# Patient Record
Sex: FEMALE | Race: BLACK OR AFRICAN AMERICAN | NOT HISPANIC OR LATINO | Employment: UNEMPLOYED | RURAL
[De-identification: names, ages, dates, MRNs, and addresses within clinical notes are randomized per-mention and may not be internally consistent; named-entity substitution may affect disease eponyms.]

---

## 2017-06-14 ENCOUNTER — HISTORICAL (OUTPATIENT)
Dept: ADMINISTRATIVE | Facility: HOSPITAL | Age: 27
End: 2017-06-14

## 2017-06-16 LAB
LAB AP CLINICAL INFORMATION: NORMAL
LAB AP DIAGNOSIS - HISTORICAL: NORMAL
LAB AP GROSS PATHOLOGY - HISTORICAL: NORMAL
LAB AP SPECIMEN SUBMITTED - HISTORICAL: NORMAL

## 2021-07-12 ENCOUNTER — HOSPITAL ENCOUNTER (EMERGENCY)
Facility: HOSPITAL | Age: 31
Discharge: HOME | End: 2021-07-12

## 2021-07-12 VITALS
HEIGHT: 61 IN | HEART RATE: 77 BPM | WEIGHT: 120 LBS | TEMPERATURE: 97 F | BODY MASS INDEX: 22.66 KG/M2 | OXYGEN SATURATION: 96 % | SYSTOLIC BLOOD PRESSURE: 121 MMHG | RESPIRATION RATE: 18 BRPM | DIASTOLIC BLOOD PRESSURE: 82 MMHG

## 2021-07-12 DIAGNOSIS — S29.012A MUSCLE STRAIN OF LEFT UPPER BACK, INITIAL ENCOUNTER: Primary | ICD-10-CM

## 2021-07-12 PROCEDURE — 96372 THER/PROPH/DIAG INJ SC/IM: CPT

## 2021-07-12 PROCEDURE — 99283 EMERGENCY DEPT VISIT LOW MDM: CPT | Mod: ,,, | Performed by: NURSE PRACTITIONER

## 2021-07-12 PROCEDURE — 63600175 PHARM REV CODE 636 W HCPCS: Performed by: NURSE PRACTITIONER

## 2021-07-12 PROCEDURE — 99284 EMERGENCY DEPT VISIT MOD MDM: CPT

## 2021-07-12 PROCEDURE — 99283 PR EMERGENCY DEPT VISIT,LEVEL III: ICD-10-PCS | Mod: ,,, | Performed by: NURSE PRACTITIONER

## 2021-07-12 RX ORDER — ORPHENADRINE CITRATE 30 MG/ML
30 INJECTION INTRAMUSCULAR; INTRAVENOUS
Status: COMPLETED | OUTPATIENT
Start: 2021-07-12 | End: 2021-07-12

## 2021-07-12 RX ORDER — TIZANIDINE HYDROCHLORIDE 2 MG/1
2 CAPSULE, GELATIN COATED ORAL EVERY 8 HOURS PRN
Qty: 20 CAPSULE | Refills: 0 | Status: SHIPPED | OUTPATIENT
Start: 2021-07-12 | End: 2021-07-22

## 2021-07-12 RX ORDER — KETOROLAC TROMETHAMINE 30 MG/ML
30 INJECTION, SOLUTION INTRAMUSCULAR; INTRAVENOUS
Status: COMPLETED | OUTPATIENT
Start: 2021-07-12 | End: 2021-07-12

## 2021-07-12 RX ORDER — IBUPROFEN 600 MG/1
600 TABLET ORAL EVERY 6 HOURS PRN
Qty: 20 TABLET | Refills: 0 | Status: SHIPPED | OUTPATIENT
Start: 2021-07-12 | End: 2023-11-03 | Stop reason: CLARIF

## 2021-07-12 RX ORDER — ORPHENADRINE CITRATE 30 MG/ML
INJECTION INTRAMUSCULAR; INTRAVENOUS
Status: DISPENSED
Start: 2021-07-12 | End: 2021-07-13

## 2021-07-12 RX ORDER — KETOROLAC TROMETHAMINE 30 MG/ML
INJECTION, SOLUTION INTRAMUSCULAR; INTRAVENOUS
Status: DISPENSED
Start: 2021-07-12 | End: 2021-07-13

## 2021-07-12 RX ADMIN — ORPHENADRINE CITRATE 30 MG: 30 INJECTION INTRAMUSCULAR; INTRAVENOUS at 02:07

## 2021-07-12 RX ADMIN — KETOROLAC TROMETHAMINE 30 MG: 30 INJECTION, SOLUTION INTRAMUSCULAR at 02:07

## 2021-07-13 ENCOUNTER — TELEPHONE (OUTPATIENT)
Dept: EMERGENCY MEDICINE | Facility: HOSPITAL | Age: 31
End: 2021-07-13

## 2022-07-06 ENCOUNTER — OFFICE VISIT (OUTPATIENT)
Dept: OBSTETRICS AND GYNECOLOGY | Facility: CLINIC | Age: 32
End: 2022-07-06
Payer: MEDICAID

## 2022-07-06 VITALS
BODY MASS INDEX: 31.72 KG/M2 | DIASTOLIC BLOOD PRESSURE: 90 MMHG | TEMPERATURE: 98 F | HEART RATE: 90 BPM | WEIGHT: 168 LBS | SYSTOLIC BLOOD PRESSURE: 112 MMHG | RESPIRATION RATE: 16 BRPM | HEIGHT: 61 IN

## 2022-07-06 DIAGNOSIS — M10.9 GOUT, ARTHRITIS: ICD-10-CM

## 2022-07-06 DIAGNOSIS — R23.2 HOT FLASHES: ICD-10-CM

## 2022-07-06 DIAGNOSIS — E55.9 VITAMIN D DEFICIENCY: ICD-10-CM

## 2022-07-06 DIAGNOSIS — Z11.3 SCREENING FOR STDS (SEXUALLY TRANSMITTED DISEASES): ICD-10-CM

## 2022-07-06 DIAGNOSIS — N62 BREAST HYPERTROPHY IN FEMALE: ICD-10-CM

## 2022-07-06 DIAGNOSIS — N91.1 SECONDARY AMENORRHEA: ICD-10-CM

## 2022-07-06 DIAGNOSIS — N91.5 OLIGOMENORRHEA, UNSPECIFIED TYPE: ICD-10-CM

## 2022-07-06 DIAGNOSIS — Z01.419 ENCOUNTER FOR ANNUAL ROUTINE GYNECOLOGICAL EXAMINATION: Primary | ICD-10-CM

## 2022-07-06 LAB
25(OH)D3 SERPL-MCNC: 29.6 NG/ML
ALBUMIN SERPL BCP-MCNC: 4.1 G/DL (ref 3.5–5)
ALBUMIN/GLOB SERPL: 1.1 {RATIO}
ALP SERPL-CCNC: 74 U/L (ref 37–98)
ALT SERPL W P-5'-P-CCNC: 22 U/L (ref 13–56)
ANION GAP SERPL CALCULATED.3IONS-SCNC: 9 MMOL/L (ref 7–16)
AST SERPL W P-5'-P-CCNC: 15 U/L (ref 15–37)
BACTERIA #/AREA URNS HPF: ABNORMAL /HPF
BASOPHILS # BLD AUTO: 0.06 K/UL (ref 0–0.2)
BASOPHILS NFR BLD AUTO: 1 % (ref 0–1)
BILIRUB SERPL-MCNC: 0.4 MG/DL (ref 0–1.2)
BILIRUB UR QL STRIP: NEGATIVE
BUN SERPL-MCNC: 13 MG/DL (ref 7–18)
BUN/CREAT SERPL: 11 (ref 6–20)
CALCIUM SERPL-MCNC: 9.5 MG/DL (ref 8.5–10.1)
CANDIDA SPECIES: NEGATIVE
CHLORIDE SERPL-SCNC: 106 MMOL/L (ref 98–107)
CHOLEST SERPL-MCNC: 165 MG/DL (ref 0–200)
CHOLEST/HDLC SERPL: 3.7 {RATIO}
CLARITY UR: CLEAR
CO2 SERPL-SCNC: 26 MMOL/L (ref 21–32)
COLOR UR: ABNORMAL
CREAT SERPL-MCNC: 1.15 MG/DL (ref 0.55–1.02)
DIFFERENTIAL METHOD BLD: ABNORMAL
EOSINOPHIL # BLD AUTO: 0.39 K/UL (ref 0–0.5)
EOSINOPHIL NFR BLD AUTO: 6.5 % (ref 1–4)
ERYTHROCYTE [DISTWIDTH] IN BLOOD BY AUTOMATED COUNT: 13.6 % (ref 11.5–14.5)
EST. AVERAGE GLUCOSE BLD GHB EST-MCNC: 100 MG/DL
ESTRADIOL SERPL-MCNC: <11 PG/ML
FSH SERPL-ACNC: 110.9 MIU/ML (ref 1.7–134.8)
GARDNERELLA: POSITIVE
GLOBULIN SER-MCNC: 3.7 G/DL (ref 2–4)
GLUCOSE SERPL-MCNC: 83 MG/DL (ref 74–106)
GLUCOSE UR STRIP-MCNC: NEGATIVE MG/DL
HBA1C MFR BLD HPLC: 5.6 % (ref 4.5–6.6)
HCG SERUM QUALITATIVE: NEGATIVE
HCT VFR BLD AUTO: 39.1 % (ref 38–47)
HDLC SERPL-MCNC: 45 MG/DL (ref 40–60)
HGB BLD-MCNC: 13.3 G/DL (ref 12–16)
IMM GRANULOCYTES # BLD AUTO: 0.02 K/UL (ref 0–0.04)
IMM GRANULOCYTES NFR BLD: 0.3 % (ref 0–0.4)
KETONES UR STRIP-SCNC: NEGATIVE MG/DL
LDLC SERPL CALC-MCNC: 109 MG/DL
LDLC/HDLC SERPL: 2.4 {RATIO}
LEUKOCYTE ESTERASE UR QL STRIP: ABNORMAL
LH SERPL-ACNC: 57.3 MIU/ML
LYMPHOCYTES # BLD AUTO: 2.3 K/UL (ref 1–4.8)
LYMPHOCYTES NFR BLD AUTO: 38.4 % (ref 27–41)
MCH RBC QN AUTO: 28.2 PG (ref 27–31)
MCHC RBC AUTO-ENTMCNC: 34 G/DL (ref 32–36)
MCV RBC AUTO: 83 FL (ref 80–96)
MONOCYTES # BLD AUTO: 0.51 K/UL (ref 0–0.8)
MONOCYTES NFR BLD AUTO: 8.5 % (ref 2–6)
MPC BLD CALC-MCNC: 10.3 FL (ref 9.4–12.4)
NEUTROPHILS # BLD AUTO: 2.71 K/UL (ref 1.8–7.7)
NEUTROPHILS NFR BLD AUTO: 45.3 % (ref 53–65)
NITRITE UR QL STRIP: NEGATIVE
NONHDLC SERPL-MCNC: 120 MG/DL
NRBC # BLD AUTO: 0 X10E3/UL
NRBC, AUTO (.00): 0 %
PH UR STRIP: 6 PH UNITS
PLATELET # BLD AUTO: 294 K/UL (ref 150–400)
POTASSIUM SERPL-SCNC: 4.2 MMOL/L (ref 3.5–5.1)
PROGEST SERPL-MCNC: 1.3 NG/ML
PROLACTIN SERPL-MCNC: 8.3 NG/ML
PROT SERPL-MCNC: 7.8 G/DL (ref 6.4–8.2)
PROT UR QL STRIP: NEGATIVE
RBC # BLD AUTO: 4.71 M/UL (ref 4.2–5.4)
RBC # UR STRIP: NEGATIVE /UL
RBC #/AREA URNS HPF: ABNORMAL /HPF
SODIUM SERPL-SCNC: 137 MMOL/L (ref 136–145)
SP GR UR STRIP: 1.02
SQUAMOUS #/AREA URNS LPF: ABNORMAL /LPF
SYPHILIS AB INTERPRETATION: NORMAL
T4 FREE SERPL-MCNC: 1.02 NG/DL (ref 0.76–1.46)
TRICHOMONAS: NEGATIVE
TRIGL SERPL-MCNC: 54 MG/DL (ref 35–150)
TSH SERPL DL<=0.005 MIU/L-ACNC: 1.37 UIU/ML (ref 0.36–3.74)
UROBILINOGEN UR STRIP-ACNC: 0.2 MG/DL
VLDLC SERPL-MCNC: 11 MG/DL
WBC # BLD AUTO: 5.99 K/UL (ref 4.5–11)
WBC #/AREA URNS HPF: ABNORMAL /HPF

## 2022-07-06 PROCEDURE — 80061 LIPID PANEL: CPT | Mod: ,,, | Performed by: CLINICAL MEDICAL LABORATORY

## 2022-07-06 PROCEDURE — 84703 HCG, SERUM, QUALITATIVE: ICD-10-PCS | Mod: ,,, | Performed by: CLINICAL MEDICAL LABORATORY

## 2022-07-06 PROCEDURE — 88142 CYTOPATH C/V THIN LAYER: CPT | Mod: GCY | Performed by: OBSTETRICS & GYNECOLOGY

## 2022-07-06 PROCEDURE — 83002 LUTEINIZING HORMONE: ICD-10-PCS | Mod: ,,, | Performed by: CLINICAL MEDICAL LABORATORY

## 2022-07-06 PROCEDURE — 3080F PR MOST RECENT DIASTOLIC BLOOD PRESSURE >= 90 MM HG: ICD-10-PCS | Mod: CPTII,,, | Performed by: OBSTETRICS & GYNECOLOGY

## 2022-07-06 PROCEDURE — 87491 CHLAMYDIA/GONORRHOEAE(GC), PCR: ICD-10-PCS | Mod: ,,, | Performed by: CLINICAL MEDICAL LABORATORY

## 2022-07-06 PROCEDURE — 84403 TESTOSTERONE, FREE (DIALYSIS) AND TOTAL, LC/MS/MS: ICD-10-PCS | Mod: 90,,, | Performed by: CLINICAL MEDICAL LABORATORY

## 2022-07-06 PROCEDURE — 87529 HSV DNA AMP PROBE: CPT | Mod: 90,,, | Performed by: CLINICAL MEDICAL LABORATORY

## 2022-07-06 PROCEDURE — 96372 THER/PROPH/DIAG INJ SC/IM: CPT | Mod: ,,, | Performed by: OBSTETRICS & GYNECOLOGY

## 2022-07-06 PROCEDURE — 80053 COMPREHENSIVE METABOLIC PANEL: ICD-10-PCS | Mod: ,,, | Performed by: CLINICAL MEDICAL LABORATORY

## 2022-07-06 PROCEDURE — 84402 TESTOSTERONE, FREE (DIALYSIS) AND TOTAL, LC/MS/MS: ICD-10-PCS | Mod: 90,,, | Performed by: CLINICAL MEDICAL LABORATORY

## 2022-07-06 PROCEDURE — 81001 URINALYSIS: ICD-10-PCS | Mod: ,,, | Performed by: CLINICAL MEDICAL LABORATORY

## 2022-07-06 PROCEDURE — 84703 CHORIONIC GONADOTROPIN ASSAY: CPT | Mod: ,,, | Performed by: CLINICAL MEDICAL LABORATORY

## 2022-07-06 PROCEDURE — 3080F DIAST BP >= 90 MM HG: CPT | Mod: CPTII,,, | Performed by: OBSTETRICS & GYNECOLOGY

## 2022-07-06 PROCEDURE — 1159F PR MEDICATION LIST DOCUMENTED IN MEDICAL RECORD: ICD-10-PCS | Mod: CPTII,,, | Performed by: OBSTETRICS & GYNECOLOGY

## 2022-07-06 PROCEDURE — 87510 BACTERIAL VAGINOSIS: ICD-10-PCS | Mod: ,,, | Performed by: CLINICAL MEDICAL LABORATORY

## 2022-07-06 PROCEDURE — 87389 HIV 1 / 2 ANTIBODY: ICD-10-PCS | Mod: ,,, | Performed by: CLINICAL MEDICAL LABORATORY

## 2022-07-06 PROCEDURE — 96372 PR INJECTION,THERAP/PROPH/DIAG2ST, IM OR SUBCUT: ICD-10-PCS | Mod: ,,, | Performed by: OBSTETRICS & GYNECOLOGY

## 2022-07-06 PROCEDURE — 87480 BACTERIAL VAGINOSIS: ICD-10-PCS | Mod: ,,, | Performed by: CLINICAL MEDICAL LABORATORY

## 2022-07-06 PROCEDURE — 36415 COLL VENOUS BLD VENIPUNCTURE: CPT | Mod: ,,, | Performed by: OBSTETRICS & GYNECOLOGY

## 2022-07-06 PROCEDURE — 87660 TRICHOMONAS VAGIN DIR PROBE: CPT | Mod: ,,, | Performed by: CLINICAL MEDICAL LABORATORY

## 2022-07-06 PROCEDURE — 82306 VITAMIN D 25 HYDROXY: CPT | Mod: ,,, | Performed by: CLINICAL MEDICAL LABORATORY

## 2022-07-06 PROCEDURE — 86780 TREPONEMA PALLIDUM (SYPHILIS) ANTIBODY: ICD-10-PCS | Mod: ,,, | Performed by: CLINICAL MEDICAL LABORATORY

## 2022-07-06 PROCEDURE — 85025 CBC WITH DIFFERENTIAL: ICD-10-PCS | Mod: ,,, | Performed by: CLINICAL MEDICAL LABORATORY

## 2022-07-06 PROCEDURE — 83001 FOLLICLE STIMULATING HORMONE: ICD-10-PCS | Mod: ,,, | Performed by: CLINICAL MEDICAL LABORATORY

## 2022-07-06 PROCEDURE — 86780 TREPONEMA PALLIDUM: CPT | Mod: ,,, | Performed by: CLINICAL MEDICAL LABORATORY

## 2022-07-06 PROCEDURE — 84146 ASSAY OF PROLACTIN: CPT | Mod: ,,, | Performed by: CLINICAL MEDICAL LABORATORY

## 2022-07-06 PROCEDURE — 81001 URINALYSIS AUTO W/SCOPE: CPT | Mod: ,,, | Performed by: CLINICAL MEDICAL LABORATORY

## 2022-07-06 PROCEDURE — 36415 PR COLLECTION VENOUS BLOOD,VENIPUNCTURE: ICD-10-PCS | Mod: ,,, | Performed by: OBSTETRICS & GYNECOLOGY

## 2022-07-06 PROCEDURE — 87660 BACTERIAL VAGINOSIS: ICD-10-PCS | Mod: ,,, | Performed by: CLINICAL MEDICAL LABORATORY

## 2022-07-06 PROCEDURE — 84443 ASSAY THYROID STIM HORMONE: CPT | Mod: ,,, | Performed by: CLINICAL MEDICAL LABORATORY

## 2022-07-06 PROCEDURE — 83036 HEMOGLOBIN A1C: ICD-10-PCS | Mod: ,,, | Performed by: CLINICAL MEDICAL LABORATORY

## 2022-07-06 PROCEDURE — 1160F RVW MEDS BY RX/DR IN RCRD: CPT | Mod: CPTII,,, | Performed by: OBSTETRICS & GYNECOLOGY

## 2022-07-06 PROCEDURE — 83036 HEMOGLOBIN GLYCOSYLATED A1C: CPT | Mod: ,,, | Performed by: CLINICAL MEDICAL LABORATORY

## 2022-07-06 PROCEDURE — 80061 LIPID PANEL: ICD-10-PCS | Mod: ,,, | Performed by: CLINICAL MEDICAL LABORATORY

## 2022-07-06 PROCEDURE — 84146 PROLACTIN: ICD-10-PCS | Mod: ,,, | Performed by: CLINICAL MEDICAL LABORATORY

## 2022-07-06 PROCEDURE — 87591 CHLAMYDIA/GONORRHOEAE(GC), PCR: ICD-10-PCS | Mod: ,,, | Performed by: CLINICAL MEDICAL LABORATORY

## 2022-07-06 PROCEDURE — 87591 N.GONORRHOEAE DNA AMP PROB: CPT | Mod: ,,, | Performed by: CLINICAL MEDICAL LABORATORY

## 2022-07-06 PROCEDURE — 84403 ASSAY OF TOTAL TESTOSTERONE: CPT | Mod: 90,,, | Performed by: CLINICAL MEDICAL LABORATORY

## 2022-07-06 PROCEDURE — 83001 ASSAY OF GONADOTROPIN (FSH): CPT | Mod: ,,, | Performed by: CLINICAL MEDICAL LABORATORY

## 2022-07-06 PROCEDURE — 99385 PREV VISIT NEW AGE 18-39: CPT | Mod: 25,,, | Performed by: OBSTETRICS & GYNECOLOGY

## 2022-07-06 PROCEDURE — 87510 GARDNER VAG DNA DIR PROBE: CPT | Mod: ,,, | Performed by: CLINICAL MEDICAL LABORATORY

## 2022-07-06 PROCEDURE — 87389 HIV-1 AG W/HIV-1&-2 AB AG IA: CPT | Mod: ,,, | Performed by: CLINICAL MEDICAL LABORATORY

## 2022-07-06 PROCEDURE — 82670 ESTRADIOL: ICD-10-PCS | Mod: ,,, | Performed by: CLINICAL MEDICAL LABORATORY

## 2022-07-06 PROCEDURE — 85025 COMPLETE CBC W/AUTO DIFF WBC: CPT | Mod: ,,, | Performed by: CLINICAL MEDICAL LABORATORY

## 2022-07-06 PROCEDURE — 83002 ASSAY OF GONADOTROPIN (LH): CPT | Mod: ,,, | Performed by: CLINICAL MEDICAL LABORATORY

## 2022-07-06 PROCEDURE — 82670 ASSAY OF TOTAL ESTRADIOL: CPT | Mod: ,,, | Performed by: CLINICAL MEDICAL LABORATORY

## 2022-07-06 PROCEDURE — 84443 THYROID PANEL: ICD-10-PCS | Mod: ,,, | Performed by: CLINICAL MEDICAL LABORATORY

## 2022-07-06 PROCEDURE — 87624 HUMAN PAPILLOMAVIRUS (HPV): ICD-10-PCS | Mod: ,,, | Performed by: CLINICAL MEDICAL LABORATORY

## 2022-07-06 PROCEDURE — 3008F PR BODY MASS INDEX (BMI) DOCUMENTED: ICD-10-PCS | Mod: CPTII,,, | Performed by: OBSTETRICS & GYNECOLOGY

## 2022-07-06 PROCEDURE — 3074F PR MOST RECENT SYSTOLIC BLOOD PRESSURE < 130 MM HG: ICD-10-PCS | Mod: CPTII,,, | Performed by: OBSTETRICS & GYNECOLOGY

## 2022-07-06 PROCEDURE — 80053 COMPREHEN METABOLIC PANEL: CPT | Mod: ,,, | Performed by: CLINICAL MEDICAL LABORATORY

## 2022-07-06 PROCEDURE — 87491 CHLMYD TRACH DNA AMP PROBE: CPT | Mod: ,,, | Performed by: CLINICAL MEDICAL LABORATORY

## 2022-07-06 PROCEDURE — 3074F SYST BP LT 130 MM HG: CPT | Mod: CPTII,,, | Performed by: OBSTETRICS & GYNECOLOGY

## 2022-07-06 PROCEDURE — 1160F PR REVIEW ALL MEDS BY PRESCRIBER/CLIN PHARMACIST DOCUMENTED: ICD-10-PCS | Mod: CPTII,,, | Performed by: OBSTETRICS & GYNECOLOGY

## 2022-07-06 PROCEDURE — 84144 PROGESTERONE: ICD-10-PCS | Mod: ,,, | Performed by: CLINICAL MEDICAL LABORATORY

## 2022-07-06 PROCEDURE — 84439 ASSAY OF FREE THYROXINE: CPT | Mod: ,,, | Performed by: CLINICAL MEDICAL LABORATORY

## 2022-07-06 PROCEDURE — 84144 ASSAY OF PROGESTERONE: CPT | Mod: ,,, | Performed by: CLINICAL MEDICAL LABORATORY

## 2022-07-06 PROCEDURE — 87529 PR  HSV, DNA, AMP PROBE: ICD-10-PCS | Mod: 90,,, | Performed by: CLINICAL MEDICAL LABORATORY

## 2022-07-06 PROCEDURE — 82306 VITAMIN D: ICD-10-PCS | Mod: ,,, | Performed by: CLINICAL MEDICAL LABORATORY

## 2022-07-06 PROCEDURE — 99385 PR PREVENTIVE VISIT,NEW,18-39: ICD-10-PCS | Mod: 25,,, | Performed by: OBSTETRICS & GYNECOLOGY

## 2022-07-06 PROCEDURE — 87480 CANDIDA DNA DIR PROBE: CPT | Mod: ,,, | Performed by: CLINICAL MEDICAL LABORATORY

## 2022-07-06 PROCEDURE — 3008F BODY MASS INDEX DOCD: CPT | Mod: CPTII,,, | Performed by: OBSTETRICS & GYNECOLOGY

## 2022-07-06 PROCEDURE — 1159F MED LIST DOCD IN RCRD: CPT | Mod: CPTII,,, | Performed by: OBSTETRICS & GYNECOLOGY

## 2022-07-06 PROCEDURE — 84439 THYROID PANEL: ICD-10-PCS | Mod: ,,, | Performed by: CLINICAL MEDICAL LABORATORY

## 2022-07-06 PROCEDURE — 84402 ASSAY OF FREE TESTOSTERONE: CPT | Mod: 90,,, | Performed by: CLINICAL MEDICAL LABORATORY

## 2022-07-06 PROCEDURE — 87624 HPV HI-RISK TYP POOLED RSLT: CPT | Mod: ,,, | Performed by: CLINICAL MEDICAL LABORATORY

## 2022-07-06 RX ORDER — PROGESTERONE 50 MG/ML
100 INJECTION, SOLUTION INTRAMUSCULAR ONCE
Status: COMPLETED | OUTPATIENT
Start: 2022-07-06 | End: 2022-07-06

## 2022-07-06 RX ORDER — ACETAMINOPHEN AND CODEINE PHOSPHATE 300; 30 MG/1; MG/1
1 TABLET ORAL
COMMUNITY
Start: 2022-05-26 | End: 2023-11-03 | Stop reason: CLARIF

## 2022-07-06 RX ADMIN — PROGESTERONE 100 MG: 50 INJECTION, SOLUTION INTRAMUSCULAR at 05:07

## 2022-07-06 NOTE — PROGRESS NOTES
"Vishal Gorge female  for   Chief Complaint   Patient presents with    Annual Exam     With pap and STD check      OB History        2    Para   1    Term   1            AB        Living   3       SAB        IAB        Ectopic        Multiple   1    Live Births   1                  HPI:  31-year-old  2, para 3 (twins) Afro-American female presents with secondary amenorrhea since 2021.  Prior to that the patient has had a history since her deliveries of oligomenorrhea.  Patient denies any hemorrhaging after birth the twins that were delivered vaginally.  Twins are age 5.    She is concerned that she is having about 5 hot flashes per day.  Started several months ago.  She is worried about premature menopause.    Patient is sexually active and is on no method of contraception.  She does request of STD check.  She is sexually active and has no dyspareunia.  She denies any vaginal dryness.  She denies any urologic symptoms.    Patient denies any change in vision or sense of smell.  She has normal swallowing and no sensation of dysphagia.    Patient denies any spotting since the last menses.  Patient may have had slight gained weight since last menses.  She is on no hormonal medication.  She has no neurologic symptoms.        Family history significant for sister with breast carcinoma she had a paternal aunt  of breast carcinoma.    Patient denies gynecologic surgery.  She has had a left inguinal herniorrhaphy in the past.  Past Medical History:   Diagnosis Date    Gout       Past Surgical History:   Procedure Laterality Date    HERNIA REPAIR        Review of patient's allergies indicates:  No Known Allergies     Review of Systems:Pertinent items are noted in HPI.     Physical exam:    BP (!) 112/90   Pulse 90   Temp 97.5 °F (36.4 °C)   Resp 16   Ht 5' 1" (1.549 m)   Wt 76.2 kg (168 lb)   LMP 2021 (Approximate) Comment: 3 days  BMI 31.74 kg/m²      General Appearance: healthy, " alert, no distress, smiling, BMI of 31.74    HEENT: Normal exam    Lymphatic: no palpable lymphadenopathy, no hepatosplenomegaly    Chest:           Breasts:No dimpling, nipple retraction or discharge. No masses or nodes., Normal to inspection, Normal breast tissue bilaterally and no galactorrhea;  bilateral breast hypertrophy hea bilaterally; no Constantino gland proliferation           Lungs:clear to auscultation bilaterally           Heart: regular rate and rhythm, S1, S2 normal, no murmur, click, rub or gallop    Abdomen: soft, non-tender, without masses or organomegaly, normal bowel sounds, without guarding and without rebound    Pelvic:                    Vulva: normal appearing vulva with no masses, tenderness or lesions                    Cervix: normal appearing cervix without discharge or lesions, multiparous os                    Uterus uterus is normal size, shape, consistency and nontender, anteverted, mobile                    Annexa(e): normal adnexa in size, nontender and no masses                   Rectal: normal rectal, no masses.     Extremity: normal    Skin: normal exam        Assessment:   Problem List Items Addressed This Visit    None     Visit Diagnoses     Encounter for annual routine gynecological examination    -  Primary    Relevant Orders    Chlamydia/GC, PCR    Herpes Simplex Virus PCR (Blood)    Treponema Pallidum (Syphillis) Antibody    HIV 1/2 Ag/Ab (4th Gen)    Estradiol    Follicle Stimulating Hormone    HCG, Serum, Qualitative    Hemoglobin A1C    Prolactin    Progesterone    Luteinizing Hormone    ThinPrep Pap Test    CBC Auto Differential    Comprehensive Metabolic Panel    Lipid Panel    Vitamin D    Urinalysis    Thyroid Panel    Testosterone, Free & Total    Secondary amenorrhea        Relevant Orders    Chlamydia/GC, PCR    Herpes Simplex Virus PCR (Blood)    Treponema Pallidum (Syphillis) Antibody    HIV 1/2 Ag/Ab (4th Gen)    Estradiol    Follicle Stimulating Hormone     HCG, Serum, Qualitative    Hemoglobin A1C    Prolactin    Progesterone    Luteinizing Hormone    ThinPrep Pap Test    CBC Auto Differential    Comprehensive Metabolic Panel    Lipid Panel    Vitamin D    Urinalysis    Thyroid Panel    Testosterone, Free & Total    Hot flashes        Relevant Orders    Chlamydia/GC, PCR    Herpes Simplex Virus PCR (Blood)    Treponema Pallidum (Syphillis) Antibody    HIV 1/2 Ag/Ab (4th Gen)    Estradiol    Follicle Stimulating Hormone    HCG, Serum, Qualitative    Hemoglobin A1C    Prolactin    Progesterone    Luteinizing Hormone    ThinPrep Pap Test    CBC Auto Differential    Comprehensive Metabolic Panel    Lipid Panel    Vitamin D    Urinalysis    Thyroid Panel    Testosterone, Free & Total    Screening for STDs (sexually transmitted diseases)        Relevant Orders    Chlamydia/GC, PCR    Herpes Simplex Virus PCR (Blood)    Treponema Pallidum (Syphillis) Antibody    HIV 1/2 Ag/Ab (4th Gen)    Estradiol    Follicle Stimulating Hormone    HCG, Serum, Qualitative    Hemoglobin A1C    Prolactin    Progesterone    Luteinizing Hormone    ThinPrep Pap Test    CBC Auto Differential    Comprehensive Metabolic Panel    Lipid Panel    Vitamin D    Urinalysis    Thyroid Panel    Testosterone, Free & Total    Oligomenorrhea, unspecified type        Relevant Orders    Chlamydia/GC, PCR    Herpes Simplex Virus PCR (Blood)    Treponema Pallidum (Syphillis) Antibody    HIV 1/2 Ag/Ab (4th Gen)    Estradiol    Follicle Stimulating Hormone    HCG, Serum, Qualitative    Hemoglobin A1C    Prolactin    Progesterone    Luteinizing Hormone    ThinPrep Pap Test    CBC Auto Differential    Comprehensive Metabolic Panel    Lipid Panel    Vitamin D    Urinalysis    Thyroid Panel    Testosterone, Free & Total    Vitamin D deficiency        Relevant Orders    Vitamin D    Gout, arthritis        Breast hypertrophy in female               Plan:  Screening labs; STD check; gonadotropins and  estradiol/progesterone/prolactin; thyroid panel; vitamin-D check             Progesterone 100 mg challenge test today             Schedule for pelvic ultrasound               Return back in 3-4 weeks for follow-up     details…

## 2022-07-06 NOTE — PATIENT INSTRUCTIONS
Dr. Arias Felton thanks you for this office visit at [facility].    Diagnosis for this visit:   Problem List Items Addressed This Visit    None       Visit Diagnoses       Encounter for annual routine gynecological examination    -  Primary    Relevant Orders    Chlamydia/GC, PCR    Herpes Simplex Virus PCR (Blood)    Treponema Pallidum (Syphillis) Antibody    HIV 1/2 Ag/Ab (4th Gen)    Estradiol    Follicle Stimulating Hormone    HCG, Serum, Qualitative    Hemoglobin A1C    Prolactin    Progesterone    Luteinizing Hormone    ThinPrep Pap Test    CBC Auto Differential    Comprehensive Metabolic Panel    Lipid Panel    Vitamin D    Urinalysis    Thyroid Panel    Testosterone, Free & Total    Secondary amenorrhea        Relevant Orders    Chlamydia/GC, PCR    Herpes Simplex Virus PCR (Blood)    Treponema Pallidum (Syphillis) Antibody    HIV 1/2 Ag/Ab (4th Gen)    Estradiol    Follicle Stimulating Hormone    HCG, Serum, Qualitative    Hemoglobin A1C    Prolactin    Progesterone    Luteinizing Hormone    ThinPrep Pap Test    CBC Auto Differential    Comprehensive Metabolic Panel    Lipid Panel    Vitamin D    Urinalysis    Thyroid Panel    Testosterone, Free & Total    Hot flashes        Relevant Orders    Chlamydia/GC, PCR    Herpes Simplex Virus PCR (Blood)    Treponema Pallidum (Syphillis) Antibody    HIV 1/2 Ag/Ab (4th Gen)    Estradiol    Follicle Stimulating Hormone    HCG, Serum, Qualitative    Hemoglobin A1C    Prolactin    Progesterone    Luteinizing Hormone    ThinPrep Pap Test    CBC Auto Differential    Comprehensive Metabolic Panel    Lipid Panel    Vitamin D    Urinalysis    Thyroid Panel    Testosterone, Free & Total    Screening for STDs (sexually transmitted diseases)        Relevant Orders    Chlamydia/GC, PCR    Herpes Simplex Virus PCR (Blood)    Treponema Pallidum (Syphillis) Antibody    HIV 1/2 Ag/Ab (4th Gen)    Estradiol    Follicle Stimulating Hormone    HCG, Serum, Qualitative     Hemoglobin A1C    Prolactin    Progesterone    Luteinizing Hormone    ThinPrep Pap Test    CBC Auto Differential    Comprehensive Metabolic Panel    Lipid Panel    Vitamin D    Urinalysis    Thyroid Panel    Testosterone, Free & Total    Oligomenorrhea, unspecified type        Relevant Orders    Chlamydia/GC, PCR    Herpes Simplex Virus PCR (Blood)    Treponema Pallidum (Syphillis) Antibody    HIV 1/2 Ag/Ab (4th Gen)    Estradiol    Follicle Stimulating Hormone    HCG, Serum, Qualitative    Hemoglobin A1C    Prolactin    Progesterone    Luteinizing Hormone    ThinPrep Pap Test    CBC Auto Differential    Comprehensive Metabolic Panel    Lipid Panel    Vitamin D    Urinalysis    Thyroid Panel    Testosterone, Free & Total    Vitamin D deficiency        Relevant Orders    Vitamin D    Gout, arthritis                 The Plan:  Patient is undergoing evaluation of secondary amenorrhea; screening labs today as well as thin prep Pap.  Patient has requested STD check and this was also performed as well.  Ultrasound will be scheduled for evaluation of her secondary amenorrhea.  She was given a progesterone challenge test of 100 mg progesterone to induce a cycle.    She is return back in 4 weeks.       Please practice best food and exercise habits for your age.    Dr. Arias Felton recommends avoidance of smoking and illicit medications or habits.    Please call  or schedule for any change in your health.     Please keep the next scheduled appointment or call for any need to change the appointment.     Dr. Arias Felton recommends yearly exams for good health maintenance.      Thank you    Dr. Arias Felton  07/06/2022 4:55 PM

## 2022-07-07 LAB
CHLAMYDIA BY PCR: NEGATIVE
HIV 1+O+2 AB SERPL QL: NORMAL
N. GONORRHOEAE (GC) BY PCR: NEGATIVE

## 2022-07-07 RX ORDER — METRONIDAZOLE 500 MG/1
500 TABLET ORAL 2 TIMES DAILY
COMMUNITY
Start: 2022-07-07 | End: 2022-10-22 | Stop reason: CLARIF

## 2022-07-07 NOTE — PROGRESS NOTES
Component      Latest Ref Rng & Units 7/6/2022   Candida sp      Negative, Invalid Negative   Gardnerella vaginalis      Negative, Invalid Positive (A)   Trichomonas      Negative, Invalid Negative       Addendum 07/07/2022: Per Dr. Felton    Patient to be treated with Flagyl 500 mg daily for 10 days for bacterial vaginosis.    Pt informed of recommended treatment for BV and also advised to use a condom during intercourse. Voiced understanding. Rx called in to pharmacy on file.

## 2022-07-08 LAB
GH SERPL-MCNC: NORMAL NG/ML
INSULIN SERPL-ACNC: NORMAL U[IU]/ML
LAB AP CLINICAL INFORMATION: NORMAL
LAB AP GYN INTERPRETATION: NEGATIVE
LAB AP PAP DISCLAIMER COMMENTS: NORMAL
RENIN PLAS-CCNC: NORMAL NG/ML/H

## 2022-07-10 LAB — MAYO GENERIC ORDERABLE RESULT: NORMAL

## 2022-07-14 LAB
HPV 16: NEGATIVE
HPV 18: NEGATIVE
HPV OTHER: NEGATIVE

## 2022-07-21 ENCOUNTER — OFFICE VISIT (OUTPATIENT)
Dept: OBSTETRICS AND GYNECOLOGY | Facility: CLINIC | Age: 32
End: 2022-07-21
Payer: MEDICAID

## 2022-07-21 VITALS
HEART RATE: 84 BPM | TEMPERATURE: 98 F | WEIGHT: 165 LBS | RESPIRATION RATE: 16 BRPM | SYSTOLIC BLOOD PRESSURE: 101 MMHG | HEIGHT: 61 IN | DIASTOLIC BLOOD PRESSURE: 74 MMHG | BODY MASS INDEX: 31.15 KG/M2

## 2022-07-21 DIAGNOSIS — E28.319 PREMATURE MENOPAUSE: ICD-10-CM

## 2022-07-21 DIAGNOSIS — N91.1 SECONDARY PHYSIOLOGIC AMENORRHEA: Primary | ICD-10-CM

## 2022-07-21 LAB
ESTRADIOL SERPL-MCNC: 60 PG/ML
FSH SERPL-ACNC: 43.2 MIU/ML (ref 1.7–134.8)
LH SERPL-ACNC: 28 MIU/ML

## 2022-07-21 PROCEDURE — 3074F PR MOST RECENT SYSTOLIC BLOOD PRESSURE < 130 MM HG: ICD-10-PCS | Mod: CPTII,,, | Performed by: OBSTETRICS & GYNECOLOGY

## 2022-07-21 PROCEDURE — 1159F MED LIST DOCD IN RCRD: CPT | Mod: CPTII,,, | Performed by: OBSTETRICS & GYNECOLOGY

## 2022-07-21 PROCEDURE — 83001 ASSAY OF GONADOTROPIN (FSH): CPT | Mod: ,,, | Performed by: CLINICAL MEDICAL LABORATORY

## 2022-07-21 PROCEDURE — 99214 OFFICE O/P EST MOD 30 MIN: CPT | Mod: ,,, | Performed by: OBSTETRICS & GYNECOLOGY

## 2022-07-21 PROCEDURE — 84403 TESTOSTERONE, FREE (DIALYSIS) AND TOTAL, LC/MS/MS: ICD-10-PCS | Mod: 90,,, | Performed by: CLINICAL MEDICAL LABORATORY

## 2022-07-21 PROCEDURE — 3078F DIAST BP <80 MM HG: CPT | Mod: CPTII,,, | Performed by: OBSTETRICS & GYNECOLOGY

## 2022-07-21 PROCEDURE — 99214 PR OFFICE/OUTPT VISIT, EST, LEVL IV, 30-39 MIN: ICD-10-PCS | Mod: ,,, | Performed by: OBSTETRICS & GYNECOLOGY

## 2022-07-21 PROCEDURE — 1160F RVW MEDS BY RX/DR IN RCRD: CPT | Mod: CPTII,,, | Performed by: OBSTETRICS & GYNECOLOGY

## 2022-07-21 PROCEDURE — 82670 ASSAY OF TOTAL ESTRADIOL: CPT | Mod: ,,, | Performed by: CLINICAL MEDICAL LABORATORY

## 2022-07-21 PROCEDURE — 3008F PR BODY MASS INDEX (BMI) DOCUMENTED: ICD-10-PCS | Mod: CPTII,,, | Performed by: OBSTETRICS & GYNECOLOGY

## 2022-07-21 PROCEDURE — 3044F PR MOST RECENT HEMOGLOBIN A1C LEVEL <7.0%: ICD-10-PCS | Mod: CPTII,,, | Performed by: OBSTETRICS & GYNECOLOGY

## 2022-07-21 PROCEDURE — 84402 ASSAY OF FREE TESTOSTERONE: CPT | Mod: 90,,, | Performed by: CLINICAL MEDICAL LABORATORY

## 2022-07-21 PROCEDURE — 36415 COLL VENOUS BLD VENIPUNCTURE: CPT | Mod: ,,, | Performed by: OBSTETRICS & GYNECOLOGY

## 2022-07-21 PROCEDURE — 83002 LUTEINIZING HORMONE: ICD-10-PCS | Mod: ,,, | Performed by: CLINICAL MEDICAL LABORATORY

## 2022-07-21 PROCEDURE — 3008F BODY MASS INDEX DOCD: CPT | Mod: CPTII,,, | Performed by: OBSTETRICS & GYNECOLOGY

## 2022-07-21 PROCEDURE — 1160F PR REVIEW ALL MEDS BY PRESCRIBER/CLIN PHARMACIST DOCUMENTED: ICD-10-PCS | Mod: CPTII,,, | Performed by: OBSTETRICS & GYNECOLOGY

## 2022-07-21 PROCEDURE — 83002 ASSAY OF GONADOTROPIN (LH): CPT | Mod: ,,, | Performed by: CLINICAL MEDICAL LABORATORY

## 2022-07-21 PROCEDURE — 84403 ASSAY OF TOTAL TESTOSTERONE: CPT | Mod: 90,,, | Performed by: CLINICAL MEDICAL LABORATORY

## 2022-07-21 PROCEDURE — 84402 TESTOSTERONE, FREE (DIALYSIS) AND TOTAL, LC/MS/MS: ICD-10-PCS | Mod: 90,,, | Performed by: CLINICAL MEDICAL LABORATORY

## 2022-07-21 PROCEDURE — 3044F HG A1C LEVEL LT 7.0%: CPT | Mod: CPTII,,, | Performed by: OBSTETRICS & GYNECOLOGY

## 2022-07-21 PROCEDURE — 3074F SYST BP LT 130 MM HG: CPT | Mod: CPTII,,, | Performed by: OBSTETRICS & GYNECOLOGY

## 2022-07-21 PROCEDURE — 3078F PR MOST RECENT DIASTOLIC BLOOD PRESSURE < 80 MM HG: ICD-10-PCS | Mod: CPTII,,, | Performed by: OBSTETRICS & GYNECOLOGY

## 2022-07-21 PROCEDURE — 36415 PR COLLECTION VENOUS BLOOD,VENIPUNCTURE: ICD-10-PCS | Mod: ,,, | Performed by: OBSTETRICS & GYNECOLOGY

## 2022-07-21 PROCEDURE — 82670 ESTRADIOL: ICD-10-PCS | Mod: ,,, | Performed by: CLINICAL MEDICAL LABORATORY

## 2022-07-21 PROCEDURE — 83001 FOLLICLE STIMULATING HORMONE: ICD-10-PCS | Mod: ,,, | Performed by: CLINICAL MEDICAL LABORATORY

## 2022-07-21 PROCEDURE — 1159F PR MEDICATION LIST DOCUMENTED IN MEDICAL RECORD: ICD-10-PCS | Mod: CPTII,,, | Performed by: OBSTETRICS & GYNECOLOGY

## 2022-07-21 RX ORDER — LORATADINE 10 MG/1
TABLET ORAL
COMMUNITY
End: 2023-11-03 | Stop reason: CLARIF

## 2022-07-21 RX ORDER — TIZANIDINE 2 MG/1
TABLET ORAL
COMMUNITY
Start: 2021-07-12 | End: 2023-11-03 | Stop reason: CLARIF

## 2022-07-21 NOTE — PROGRESS NOTES
"Vishal Pennington female  for   Chief Complaint   Patient presents with    Follow-up     Patient cycle has not started after receiving progesterone injection.                                         PHI:  31-year-old  2, para 3 (1 set of twins) presents for follow-up after complete physical exam and endocrine workup.    Endocrine workup indicates she has elevated gonadotropins and very low estradiol at 11 pg per mL.  Progesterone was low as well.  Prolactin level was normal.    Patient did receive progesterone challenge test with 100 mg of progesterone IM at her last visit in 2022. She did not have in response from the challenge test.  There was no bleeding.  She did relate there was some relief of hot flashes.  She denies vaginal dryness but she is not sexually active.    She denies any visual changes or neurologic symptoms.    Her last normal menstrual period was 2021.    Her STD screen was negative.        Past Medical History:   Diagnosis Date    Gout       Past Surgical History:   Procedure Laterality Date    HERNIA REPAIR        Review of patient's allergies indicates:  No Known Allergies     ROS:Pertinent items are noted in HPI.    Physical exam:    /74 (BP Location: Right arm, Patient Position: Sitting)   Pulse 84   Temp 97.6 °F (36.4 °C)   Resp 16   Ht 5' 1" (1.549 m)   Wt 74.8 kg (165 lb)   BMI 31.18 kg/m²      General Appearance: healthy, alert, no distress, smiling, BMI of 31    Chest:           Lungs: clear to auscultation bilaterally           Heart: regular rate and rhythm, S1, S2 normal, no murmur, click, rub or gallop    Abdomen:  Unremarkable; obese  Pelvic:  Normal vulva; speculum exam unremarkable; cervix multiparous; uterus is anteflexed and not enlarged and non tender; no evidence of adnexal enlargement bilaterally-including examination rectally.    Extremity: normal    Skin: normal exam        Assessment:   Problem List Items Addressed This Visit    None   "   Visit Diagnoses     Secondary physiologic amenorrhea    -  Primary    Premature menopause               Plan:  Repeat gonadotropins             Patient does need ultrasound prior prescribing HRT therapy.              Follow-up after her ultrasound.  She needs to have a CT of the head evaluate the pituitary function and any neurologic disorder that had.

## 2022-07-21 NOTE — PATIENT INSTRUCTIONS
Dr. Arias Felton thanks you for this office visit at [facility].    Diagnosis for this visit:   Problem List Items Addressed This Visit    None       Visit Diagnoses       Secondary physiologic amenorrhea    -  Primary    Relevant Orders    Estradiol    Follicle Stimulating Hormone    Luteinizing Hormone    Testosterone, Free & Total    Premature menopause        Relevant Orders    Estradiol    Follicle Stimulating Hormone    Luteinizing Hormone    Testosterone, Free & Total             The Plan:  Patient was advised clinical evidence and laboratory studies suggest premature menopause.  Patient will have repeat gonadotropins as well as estradiol and testosterone today.  Dr. Arias Felton recommends to evaluate for any neurologic disorder causing a problem by means of a CT scan of the head.  She does need the pelvic ultrasound that was ordered but not evidently performed at this time.    She is revisit her next scheduled appointment.      Please practice best food and exercise habits for your age.    Dr. Arias Felton recommends avoidance of smoking and illicit medications or habits.    Please call  or schedule for any change in your health.     Please keep the next scheduled appointment or call for any need to change the appointment.     Dr. Arias Felton recommends yearly exams for good health maintenance.      Thank you    Dr. Arias Felton  07/21/2022 4:59 PM

## 2022-07-24 LAB
TESTOST FREE SERPL-MCNC: 0.37 NG/DL
TESTOST SERPL-MCNC: 11 NG/DL (ref 8–60)

## 2022-08-03 DIAGNOSIS — N91.5 OLIGOMENORRHEA, UNSPECIFIED TYPE: ICD-10-CM

## 2022-08-03 DIAGNOSIS — N91.1 SECONDARY PHYSIOLOGIC AMENORRHEA: Primary | ICD-10-CM

## 2022-08-08 LAB
TESTOST FREE SERPL-MCNC: 0.3 NG/DL
TESTOST SERPL-MCNC: 11 NG/DL (ref 8–60)

## 2022-08-09 ENCOUNTER — TELEPHONE (OUTPATIENT)
Dept: OBSTETRICS AND GYNECOLOGY | Facility: CLINIC | Age: 32
End: 2022-08-09
Payer: MEDICAID

## 2022-08-09 ENCOUNTER — HOSPITAL ENCOUNTER (OUTPATIENT)
Dept: RADIOLOGY | Facility: HOSPITAL | Age: 32
Discharge: HOME | End: 2022-08-09
Attending: OBSTETRICS & GYNECOLOGY
Payer: MEDICAID

## 2022-08-09 DIAGNOSIS — N91.5 OLIGOMENORRHEA, UNSPECIFIED TYPE: ICD-10-CM

## 2022-08-09 DIAGNOSIS — N91.1 SECONDARY PHYSIOLOGIC AMENORRHEA: ICD-10-CM

## 2022-08-09 PROCEDURE — 76856 US EXAM PELVIC COMPLETE: CPT | Mod: 26,,, | Performed by: RADIOLOGY

## 2022-08-09 PROCEDURE — 76856 US PELVIS COMP WITH TRANSVAG NON-OB (XPD): ICD-10-PCS | Mod: 26,,, | Performed by: RADIOLOGY

## 2022-08-09 PROCEDURE — 76830 TRANSVAGINAL US NON-OB: CPT | Mod: TC

## 2022-08-09 PROCEDURE — 76830 US PELVIS COMP WITH TRANSVAG NON-OB (XPD): ICD-10-PCS | Mod: 26,,, | Performed by: RADIOLOGY

## 2022-08-09 PROCEDURE — 76830 TRANSVAGINAL US NON-OB: CPT | Mod: 26,,, | Performed by: RADIOLOGY

## 2022-08-11 ENCOUNTER — OFFICE VISIT (OUTPATIENT)
Dept: OBSTETRICS AND GYNECOLOGY | Facility: CLINIC | Age: 32
End: 2022-08-11
Payer: MEDICAID

## 2022-08-11 VITALS
HEIGHT: 61 IN | RESPIRATION RATE: 16 BRPM | DIASTOLIC BLOOD PRESSURE: 78 MMHG | BODY MASS INDEX: 31.57 KG/M2 | HEART RATE: 56 BPM | WEIGHT: 167.19 LBS | SYSTOLIC BLOOD PRESSURE: 109 MMHG | TEMPERATURE: 98 F

## 2022-08-11 DIAGNOSIS — N91.1 SECONDARY AMENORRHEA: Primary | ICD-10-CM

## 2022-08-11 DIAGNOSIS — Z78.0 MENOPAUSE: ICD-10-CM

## 2022-08-11 LAB
ESTRADIOL SERPL-MCNC: 34.7 PG/ML
FSH SERPL-ACNC: 54.1 MIU/ML (ref 1.7–134.8)
LH SERPL-ACNC: 34.4 MIU/ML

## 2022-08-11 PROCEDURE — 3074F PR MOST RECENT SYSTOLIC BLOOD PRESSURE < 130 MM HG: ICD-10-PCS | Mod: CPTII,,, | Performed by: OBSTETRICS & GYNECOLOGY

## 2022-08-11 PROCEDURE — 36415 COLL VENOUS BLD VENIPUNCTURE: CPT | Mod: ,,, | Performed by: OBSTETRICS & GYNECOLOGY

## 2022-08-11 PROCEDURE — 3044F PR MOST RECENT HEMOGLOBIN A1C LEVEL <7.0%: ICD-10-PCS | Mod: CPTII,,, | Performed by: OBSTETRICS & GYNECOLOGY

## 2022-08-11 PROCEDURE — 83002 ASSAY OF GONADOTROPIN (LH): CPT | Mod: ,,, | Performed by: CLINICAL MEDICAL LABORATORY

## 2022-08-11 PROCEDURE — 3078F DIAST BP <80 MM HG: CPT | Mod: CPTII,,, | Performed by: OBSTETRICS & GYNECOLOGY

## 2022-08-11 PROCEDURE — 3078F PR MOST RECENT DIASTOLIC BLOOD PRESSURE < 80 MM HG: ICD-10-PCS | Mod: CPTII,,, | Performed by: OBSTETRICS & GYNECOLOGY

## 2022-08-11 PROCEDURE — 3074F SYST BP LT 130 MM HG: CPT | Mod: CPTII,,, | Performed by: OBSTETRICS & GYNECOLOGY

## 2022-08-11 PROCEDURE — 82670 ASSAY OF TOTAL ESTRADIOL: CPT | Mod: ,,, | Performed by: CLINICAL MEDICAL LABORATORY

## 2022-08-11 PROCEDURE — 3008F BODY MASS INDEX DOCD: CPT | Mod: CPTII,,, | Performed by: OBSTETRICS & GYNECOLOGY

## 2022-08-11 PROCEDURE — 83001 ASSAY OF GONADOTROPIN (FSH): CPT | Mod: ,,, | Performed by: CLINICAL MEDICAL LABORATORY

## 2022-08-11 PROCEDURE — 3008F PR BODY MASS INDEX (BMI) DOCUMENTED: ICD-10-PCS | Mod: CPTII,,, | Performed by: OBSTETRICS & GYNECOLOGY

## 2022-08-11 PROCEDURE — 82670 ESTRADIOL: ICD-10-PCS | Mod: ,,, | Performed by: CLINICAL MEDICAL LABORATORY

## 2022-08-11 PROCEDURE — 3044F HG A1C LEVEL LT 7.0%: CPT | Mod: CPTII,,, | Performed by: OBSTETRICS & GYNECOLOGY

## 2022-08-11 PROCEDURE — 83002 LUTEINIZING HORMONE: ICD-10-PCS | Mod: ,,, | Performed by: CLINICAL MEDICAL LABORATORY

## 2022-08-11 PROCEDURE — 83001 FOLLICLE STIMULATING HORMONE: ICD-10-PCS | Mod: ,,, | Performed by: CLINICAL MEDICAL LABORATORY

## 2022-08-11 PROCEDURE — 36415 PR COLLECTION VENOUS BLOOD,VENIPUNCTURE: ICD-10-PCS | Mod: ,,, | Performed by: OBSTETRICS & GYNECOLOGY

## 2022-08-11 PROCEDURE — 99214 PR OFFICE/OUTPT VISIT, EST, LEVL IV, 30-39 MIN: ICD-10-PCS | Mod: ,,, | Performed by: OBSTETRICS & GYNECOLOGY

## 2022-08-11 PROCEDURE — 99214 OFFICE O/P EST MOD 30 MIN: CPT | Mod: ,,, | Performed by: OBSTETRICS & GYNECOLOGY

## 2022-08-11 PROCEDURE — 1159F MED LIST DOCD IN RCRD: CPT | Mod: CPTII,,, | Performed by: OBSTETRICS & GYNECOLOGY

## 2022-08-11 PROCEDURE — 1159F PR MEDICATION LIST DOCUMENTED IN MEDICAL RECORD: ICD-10-PCS | Mod: CPTII,,, | Performed by: OBSTETRICS & GYNECOLOGY

## 2022-08-11 NOTE — PROGRESS NOTES
"Vishal Pennington female  for   Chief Complaint   Patient presents with    Follow-up     Follow up on pelvic ultrasound and CT results          PHI:  31-year-old   2, para 3 (twins) presents for follow-up.  Her last menstrual period was 2022 and lasted for 5 days.  She did have dysmenorrhea.  She received the progesterone challenge test at her very 1st visit in 2022 at the clinic.    Her insurance company has refused a CT scan of the head.  Her labs reviewed.  She still has elevated gonadotropins and a low estradiol level.    She has no dyspareunia or vaginal dryness complaints.        Past Medical History:   Diagnosis Date    Gout       Past Surgical History:   Procedure Laterality Date    HERNIA REPAIR        Review of patient's allergies indicates:  No Known Allergies     ROS:Pertinent items are noted in HPI.    Physical exam:    /78 (BP Location: Left arm, Patient Position: Sitting)   Pulse (!) 56   Temp 97.9 °F (36.6 °C)   Resp 16   Ht 5' 1" (1.549 m)   Wt 75.8 kg (167 lb 3.2 oz)   LMP 2022   BMI 31.59 kg/m²      General Appearance: healthy, alert, no distress, smiling    Chest:           Lungs: clear to auscultation bilaterally           Heart: regular rate and rhythm, S1, S2 normal, no murmur, click, rub or gallop    Abdomen:not performed    Pelvic: not performed     Extremity: normal    Skin: normal exam        Assessment:   Problem List Items Addressed This Visit    None       Visit Diagnoses       Secondary amenorrhea    -  Primary    After many months after progesterone challenge test she did have a bleeding many weeks after progesterone challenge test; 2022 for 5 days    Relevant Orders    Follicle Stimulating Hormone    Estradiol    Luteinizing Hormone    Menopause        After many months after progesterone challenge test she did have a bleeding many weeks after progesterone challenge test; 2022 for 5 days    Relevant Orders    Follicle " Stimulating Hormone    Estradiol    Luteinizing Hormone             Plan:  Probably early premature menopause but her cycle should be monitored.  She now has currently decreased menopausal symptoms since the progesterone injection.    Recommendation follow-up in 3 months.

## 2022-08-11 NOTE — PATIENT INSTRUCTIONS
Dr. Arias Felton thanks you for this [facility] visit [facility].    Diagnosis for this visit:   Problem List Items Addressed This Visit    None       Visit Diagnoses       Secondary amenorrhea    -  Primary    Menopause                 The Plan:  Recommendations monitor for resumption of menstrual cycling and also for the intensity and frequency of her hot flashes.  Today Dr. Arias Felton will order gonadotropins and estradiol.  Her previous levels were elevated for gonadotropins and her estradiol level was low.  Insurance has refused a CT scan.  Her prolactin level is normal.      Please practice best food and exercise habits for your age.    Dr. Arias Felton recommends avoidance of smoking and illicit medications or habits.    Please call  or schedule for any change in your health.     Please keep the next scheduled appointment or call for any need to change the appointment.     Dr. Arias Felton recommends yearly exams for good health maintenance.      Thank you    Dr. Arias Felton  08/11/2022 4:53 PM

## 2022-08-12 ENCOUNTER — APPOINTMENT (OUTPATIENT)
Dept: RADIOLOGY | Facility: CLINIC | Age: 32
End: 2022-08-12
Attending: FAMILY MEDICINE
Payer: MEDICAID

## 2022-08-12 ENCOUNTER — OFFICE VISIT (OUTPATIENT)
Dept: FAMILY MEDICINE | Facility: CLINIC | Age: 32
End: 2022-08-12
Payer: MEDICAID

## 2022-08-12 VITALS
DIASTOLIC BLOOD PRESSURE: 70 MMHG | BODY MASS INDEX: 31.53 KG/M2 | HEART RATE: 79 BPM | WEIGHT: 167 LBS | HEIGHT: 61 IN | SYSTOLIC BLOOD PRESSURE: 99 MMHG

## 2022-08-12 DIAGNOSIS — M10.041 IDIOPATHIC GOUT OF RIGHT HAND, UNSPECIFIED CHRONICITY: ICD-10-CM

## 2022-08-12 DIAGNOSIS — M79.641 PAIN OF RIGHT HAND: Primary | ICD-10-CM

## 2022-08-12 DIAGNOSIS — M79.641 PAIN OF RIGHT HAND: ICD-10-CM

## 2022-08-12 PROCEDURE — 3074F PR MOST RECENT SYSTOLIC BLOOD PRESSURE < 130 MM HG: ICD-10-PCS | Mod: CPTII,,, | Performed by: FAMILY MEDICINE

## 2022-08-12 PROCEDURE — 3008F BODY MASS INDEX DOCD: CPT | Mod: CPTII,,, | Performed by: FAMILY MEDICINE

## 2022-08-12 PROCEDURE — 73130 X-RAY EXAM OF HAND: CPT | Mod: TC,RHCUB,FY,RT | Performed by: FAMILY MEDICINE

## 2022-08-12 PROCEDURE — 1160F RVW MEDS BY RX/DR IN RCRD: CPT | Mod: CPTII,,, | Performed by: FAMILY MEDICINE

## 2022-08-12 PROCEDURE — 84550 ASSAY OF BLOOD/URIC ACID: CPT | Mod: ,,, | Performed by: CLINICAL MEDICAL LABORATORY

## 2022-08-12 PROCEDURE — 99204 PR OFFICE/OUTPT VISIT, NEW, LEVL IV, 45-59 MIN: ICD-10-PCS | Mod: ,,, | Performed by: FAMILY MEDICINE

## 2022-08-12 PROCEDURE — 3078F PR MOST RECENT DIASTOLIC BLOOD PRESSURE < 80 MM HG: ICD-10-PCS | Mod: CPTII,,, | Performed by: FAMILY MEDICINE

## 2022-08-12 PROCEDURE — 1160F PR REVIEW ALL MEDS BY PRESCRIBER/CLIN PHARMACIST DOCUMENTED: ICD-10-PCS | Mod: CPTII,,, | Performed by: FAMILY MEDICINE

## 2022-08-12 PROCEDURE — 3008F PR BODY MASS INDEX (BMI) DOCUMENTED: ICD-10-PCS | Mod: CPTII,,, | Performed by: FAMILY MEDICINE

## 2022-08-12 PROCEDURE — 99204 OFFICE O/P NEW MOD 45 MIN: CPT | Mod: ,,, | Performed by: FAMILY MEDICINE

## 2022-08-12 PROCEDURE — 84550 URIC ACID: ICD-10-PCS | Mod: ,,, | Performed by: CLINICAL MEDICAL LABORATORY

## 2022-08-12 PROCEDURE — 1159F MED LIST DOCD IN RCRD: CPT | Mod: CPTII,,, | Performed by: FAMILY MEDICINE

## 2022-08-12 PROCEDURE — 1159F PR MEDICATION LIST DOCUMENTED IN MEDICAL RECORD: ICD-10-PCS | Mod: CPTII,,, | Performed by: FAMILY MEDICINE

## 2022-08-12 PROCEDURE — 3044F HG A1C LEVEL LT 7.0%: CPT | Mod: CPTII,,, | Performed by: FAMILY MEDICINE

## 2022-08-12 PROCEDURE — 3044F PR MOST RECENT HEMOGLOBIN A1C LEVEL <7.0%: ICD-10-PCS | Mod: CPTII,,, | Performed by: FAMILY MEDICINE

## 2022-08-12 PROCEDURE — 3074F SYST BP LT 130 MM HG: CPT | Mod: CPTII,,, | Performed by: FAMILY MEDICINE

## 2022-08-12 PROCEDURE — 3078F DIAST BP <80 MM HG: CPT | Mod: CPTII,,, | Performed by: FAMILY MEDICINE

## 2022-08-12 RX ORDER — ACETAMINOPHEN AND CODEINE PHOSPHATE 300; 30 MG/1; MG/1
1 TABLET ORAL EVERY 6 HOURS PRN
Qty: 30 TABLET | Refills: 0 | Status: SHIPPED | OUTPATIENT
Start: 2022-08-12 | End: 2022-08-22

## 2022-08-12 NOTE — PROGRESS NOTES
Yemi Us MD   [facility]  MEDICAL GROUP [address] [address]  [phone]      PATIENT NAME: Vishal Pennington  : 1990  DATE: 22  MRN: [id number]      Billing Provider: Yemi Us MD  Level of Service:   Patient PCP Information       Provider PCP Type    Yemi Us MD General            Reason for Visit / Chief Complaint: Other (Complains of right hand pain due to suspected gout)       Update PCP  Update Chief Complaint         History of Present Illness / Problem Focused Workflow     Vishal Pennington presents to the clinic with Other (Complains of right hand pain due to suspected gout)       Says that she was diagnosed with gout at ED last year.  Has had intermittent pain since that time.      Review of Systems     Review of Systems   Constitutional: Negative for activity change, chills and fever.   HENT: Negative for sore throat.    Eyes: Negative for pain.   Respiratory: Negative for cough, chest tightness and shortness of breath.    Cardiovascular: Negative for chest pain and palpitations.   Gastrointestinal: Negative for abdominal pain.   Musculoskeletal: Positive for arthralgias.   Neurological: Negative for dizziness, syncope and weakness.   Psychiatric/Behavioral: Negative for confusion.        Medical / Social / Family History     Past Medical History:   Diagnosis Date    Gout        Past Surgical History:   Procedure Laterality Date    HERNIA REPAIR         Social History    reports that she has never smoked. She has never used smokeless tobacco. She reports current alcohol use. She reports that she does not use drugs.   Social History     Tobacco Use    Smoking status: Never Smoker    Smokeless tobacco: Never Used   Substance Use Topics    Alcohol use: Yes    Drug use: Never       Family History  Family History   Problem Relation Age of Onset    Hypertension Mother     Diabetes  Mother     Breast cancer Sister     Breast cancer Paternal Aunt        Medications and Allergies     Medications  No outpatient medications have been marked as taking for the 8/12/22 encounter (Office Visit) with Yemi Us MD.       Allergies  Review of patient's allergies indicates:  No Known Allergies    Physical Examination     Vitals:    08/12/22 1558   BP: 99/70   Pulse: 79     Physical Exam  Vitals reviewed.   Constitutional:       Appearance: Normal appearance.   HENT:      Head: Normocephalic and atraumatic.   Eyes:      Extraocular Movements: Extraocular movements intact.      Conjunctiva/sclera: Conjunctivae normal.      Pupils: Pupils are equal, round, and reactive to light.   Cardiovascular:      Rate and Rhythm: Normal rate and regular rhythm.      Heart sounds: Normal heart sounds.   Pulmonary:      Effort: Pulmonary effort is normal.      Breath sounds: Normal breath sounds.   Musculoskeletal:         General: Tenderness (mid-palm right hand) present. No deformity. Normal range of motion.      Cervical back: Normal range of motion.      Comments: Neg Finkelstein test   Skin:     General: Skin is warm and dry.      Findings: No erythema.   Neurological:      General: No focal deficit present.      Mental Status: She is alert and oriented to person, place, and time.   Psychiatric:         Mood and Affect: Mood normal.         Behavior: Behavior normal.        Office Visit on 08/12/2022   Component Date Value Ref Range Status    Uric Acid 08/12/2022 6.6 (A) 2.6 - 6.0 mg/dL Final     X-Ray Hand 3 View Right  Narrative: EXAMINATION:  XR HAND COMPLETE 3 VIEW RIGHT    CLINICAL HISTORY:  .  Pain in right hand    COMPARISON:  None    TECHNIQUE:  AP, lateral, and oblique views right hand    FINDINGS:  There is no acute fracture or dislocation.  There is no focal destructive osseous abnormality.  There is no obvious joint effusion.  Osseous structures are well mineralized.  Impression: No acute bony  abnormality    Electronically signed by: Enoc Melendez  Date:    08/12/2022  Time:    18:22          Assessment and Plan (including Health Maintenance)      Problem List  Smart Sets  Document Outside HM   :    Plan: discussed gout diet  RTC prn      Health Maintenance Due   Topic Date Due    Hepatitis C Screening  Never done    COVID-19 Vaccine (1) Never done    TETANUS VACCINE  Never done       Problem List Items Addressed This Visit    None     Visit Diagnoses     Pain of right hand    -  Primary    Relevant Medications    acetaminophen-codeine 300-30mg (TYLENOL #3) 300-30 mg Tab    Other Relevant Orders    Uric Acid (Completed)    X-Ray Hand 3 View Right (Completed)    Idiopathic gout of right hand, unspecified chronicity              Health Maintenance Topics with due status: Not Due       Topic Last Completion Date    Influenza Vaccine 12/29/2015    Cervical Cancer Screening 07/06/2022       Future Appointments   Date Time Provider Department Center   11/8/2022 10:30 AM Arias Felton MD [facility]            Signature:  Yemi Us MD  [facility]  MEDICAL GROUP [facility]  [address] [place] [id number]  [phone]    Date of encounter: 8/12/22

## 2022-08-13 LAB — URATE SERPL-MCNC: 6.6 MG/DL (ref 2.6–6)

## 2022-10-22 ENCOUNTER — HOSPITAL ENCOUNTER (EMERGENCY)
Facility: HOSPITAL | Age: 32
Discharge: HOME | End: 2022-10-22
Payer: MEDICAID

## 2022-10-22 VITALS
DIASTOLIC BLOOD PRESSURE: 83 MMHG | WEIGHT: 160 LBS | TEMPERATURE: 101 F | OXYGEN SATURATION: 100 % | RESPIRATION RATE: 18 BRPM | HEIGHT: 60 IN | BODY MASS INDEX: 31.41 KG/M2 | HEART RATE: 107 BPM | SYSTOLIC BLOOD PRESSURE: 127 MMHG

## 2022-10-22 DIAGNOSIS — B34.9 VIRAL SYNDROME: Primary | ICD-10-CM

## 2022-10-22 PROCEDURE — 99281 EMR DPT VST MAYX REQ PHY/QHP: CPT

## 2022-10-22 PROCEDURE — 99282 EMERGENCY DEPT VISIT SF MDM: CPT | Mod: ,,, | Performed by: NURSE PRACTITIONER

## 2022-10-22 PROCEDURE — 99282 PR EMERGENCY DEPT VISIT,LEVEL II: ICD-10-PCS | Mod: ,,, | Performed by: NURSE PRACTITIONER

## 2022-10-22 NOTE — ED PROVIDER NOTES
Encounter Date: 10/22/2022       History     Chief Complaint   Patient presents with    Fever     2 days ago    Cough    Headache     Patient presents to the ED with complaints of headache, body aches, cough and congestion. Reports her daughter has been sick with the flu this week.     The history is provided by the patient.   Review of patient's allergies indicates:  No Known Allergies  Past Medical History:   Diagnosis Date    Gout      Past Surgical History:   Procedure Laterality Date    HERNIA REPAIR       Family History   Problem Relation Age of Onset    Hypertension Mother     Diabetes Mother     Breast cancer Sister     Breast cancer Paternal Aunt      Social History     Tobacco Use    Smoking status: Never    Smokeless tobacco: Never   Substance Use Topics    Alcohol use: Yes    Drug use: Never     Review of Systems   Constitutional:  Positive for chills.   HENT:  Positive for congestion and sneezing.    Respiratory:  Positive for cough.    Cardiovascular: Negative.    Gastrointestinal: Negative.    Musculoskeletal:  Positive for myalgias.   Skin: Negative.    Neurological:  Positive for headaches.   All other systems reviewed and are negative.    Physical Exam     Initial Vitals [10/22/22 1409]   BP Pulse Resp Temp SpO2   127/83 107 18 (!) 100.7 °F (38.2 °C) 100 %      MAP       --         Physical Exam    Vitals reviewed.  Constitutional: She appears well-developed and well-nourished.   HENT:   Head: Normocephalic and atraumatic.   Neck: Neck supple.   Normal range of motion.  Cardiovascular:  Normal rate, regular rhythm, normal heart sounds and intact distal pulses.           Pulmonary/Chest: Breath sounds normal.   Musculoskeletal:         General: Normal range of motion.      Cervical back: Normal range of motion and neck supple.     Neurological: She is alert and oriented to person, place, and time. She has normal strength. GCS score is 15. GCS eye subscore is 4. GCS verbal subscore is 5. GCS motor  subscore is 6.   Skin: Skin is warm and dry. Capillary refill takes less than 2 seconds.   Psychiatric: She has a normal mood and affect. Her behavior is normal. Judgment and thought content normal.       Medical Screening Exam   See Full Note    ED Course   Procedures  Labs Reviewed - No data to display       Imaging Results    None          Medications - No data to display  Medical Decision Making:   ED Management:  Patient advised that her symptoms are consistent with a virus such as the flu, instructed on supportive care.                  Clinical Impression:   Final diagnoses:  [B34.9] Viral syndrome (Primary)      ED Disposition Condition    Discharge Stable          ED Prescriptions    None       Follow-up Information       Follow up With Specialties Details Why Contact Info    Arias Felton MD Gynecology In 1 week  [address]  [facility]  [facility] [address]  [phone]               Carmen Rodgers, DEREK  10/22/22 6064

## 2022-11-09 ENCOUNTER — OFFICE VISIT (OUTPATIENT)
Dept: OBSTETRICS AND GYNECOLOGY | Facility: CLINIC | Age: 32
End: 2022-11-09
Payer: MEDICAID

## 2022-11-09 VITALS
HEIGHT: 60 IN | BODY MASS INDEX: 32.98 KG/M2 | HEART RATE: 89 BPM | WEIGHT: 168 LBS | TEMPERATURE: 98 F | SYSTOLIC BLOOD PRESSURE: 111 MMHG | DIASTOLIC BLOOD PRESSURE: 86 MMHG | RESPIRATION RATE: 16 BRPM

## 2022-11-09 DIAGNOSIS — R23.2 HOT FLASHES: ICD-10-CM

## 2022-11-09 DIAGNOSIS — Z87.42 HISTORY OF IRREGULAR MENSTRUAL CYCLES: Primary | ICD-10-CM

## 2022-11-09 LAB
BASOPHILS # BLD AUTO: 0.09 K/UL (ref 0–0.2)
BASOPHILS NFR BLD AUTO: 1.3 % (ref 0–1)
BILIRUB UR QL STRIP: NEGATIVE
CLARITY UR: CLEAR
COLOR UR: COLORLESS
DIFFERENTIAL METHOD BLD: ABNORMAL
EOSINOPHIL # BLD AUTO: 0.5 K/UL (ref 0–0.5)
EOSINOPHIL NFR BLD AUTO: 7.1 % (ref 1–4)
ERYTHROCYTE [DISTWIDTH] IN BLOOD BY AUTOMATED COUNT: 13.6 % (ref 11.5–14.5)
ESTRADIOL SERPL-MCNC: <11 PG/ML
FSH SERPL-ACNC: 101.6 MIU/ML (ref 1.7–134.8)
GLUCOSE UR STRIP-MCNC: NORMAL MG/DL
HCG SERPL-ACNC: 1 MIU/ML
HCT VFR BLD AUTO: 39.7 % (ref 38–47)
HGB BLD-MCNC: 13.3 G/DL (ref 12–16)
IMM GRANULOCYTES # BLD AUTO: 0.02 K/UL (ref 0–0.04)
IMM GRANULOCYTES NFR BLD: 0.3 % (ref 0–0.4)
KETONES UR STRIP-SCNC: NEGATIVE MG/DL
LEUKOCYTE ESTERASE UR QL STRIP: NEGATIVE
LH SERPL-ACNC: 61.6 MIU/ML
LYMPHOCYTES # BLD AUTO: 3.27 K/UL (ref 1–4.8)
LYMPHOCYTES NFR BLD AUTO: 46.1 % (ref 27–41)
MCH RBC QN AUTO: 28 PG (ref 27–31)
MCHC RBC AUTO-ENTMCNC: 33.5 G/DL (ref 32–36)
MCV RBC AUTO: 83.6 FL (ref 80–96)
MONOCYTES # BLD AUTO: 0.58 K/UL (ref 0–0.8)
MONOCYTES NFR BLD AUTO: 8.2 % (ref 2–6)
MPC BLD CALC-MCNC: 10.5 FL (ref 9.4–12.4)
NEUTROPHILS # BLD AUTO: 2.63 K/UL (ref 1.8–7.7)
NEUTROPHILS NFR BLD AUTO: 37 % (ref 53–65)
NITRITE UR QL STRIP: NEGATIVE
NRBC # BLD AUTO: 0 X10E3/UL
NRBC, AUTO (.00): 0 %
PH UR STRIP: 6.5 PH UNITS
PLATELET # BLD AUTO: 333 K/UL (ref 150–400)
PROGEST SERPL-MCNC: 0.46 NG/ML
PROLACTIN SERPL-MCNC: 6 NG/ML
PROT UR QL STRIP: NEGATIVE
RBC # BLD AUTO: 4.75 M/UL (ref 4.2–5.4)
RBC # UR STRIP: NEGATIVE /UL
SP GR UR STRIP: 1.01
UROBILINOGEN UR STRIP-ACNC: NORMAL MG/DL
WBC # BLD AUTO: 7.09 K/UL (ref 4.5–11)

## 2022-11-09 PROCEDURE — 1160F PR REVIEW ALL MEDS BY PRESCRIBER/CLIN PHARMACIST DOCUMENTED: ICD-10-PCS | Mod: CPTII,,, | Performed by: OBSTETRICS & GYNECOLOGY

## 2022-11-09 PROCEDURE — 83002 LUTEINIZING HORMONE: ICD-10-PCS | Mod: ,,, | Performed by: CLINICAL MEDICAL LABORATORY

## 2022-11-09 PROCEDURE — 1159F MED LIST DOCD IN RCRD: CPT | Mod: CPTII,,, | Performed by: OBSTETRICS & GYNECOLOGY

## 2022-11-09 PROCEDURE — 3044F HG A1C LEVEL LT 7.0%: CPT | Mod: CPTII,,, | Performed by: OBSTETRICS & GYNECOLOGY

## 2022-11-09 PROCEDURE — 84146 ASSAY OF PROLACTIN: CPT | Mod: ,,, | Performed by: CLINICAL MEDICAL LABORATORY

## 2022-11-09 PROCEDURE — 1160F RVW MEDS BY RX/DR IN RCRD: CPT | Mod: CPTII,,, | Performed by: OBSTETRICS & GYNECOLOGY

## 2022-11-09 PROCEDURE — 3008F BODY MASS INDEX DOCD: CPT | Mod: CPTII,,, | Performed by: OBSTETRICS & GYNECOLOGY

## 2022-11-09 PROCEDURE — 3008F PR BODY MASS INDEX (BMI) DOCUMENTED: ICD-10-PCS | Mod: CPTII,,, | Performed by: OBSTETRICS & GYNECOLOGY

## 2022-11-09 PROCEDURE — 3079F PR MOST RECENT DIASTOLIC BLOOD PRESSURE 80-89 MM HG: ICD-10-PCS | Mod: CPTII,,, | Performed by: OBSTETRICS & GYNECOLOGY

## 2022-11-09 PROCEDURE — 99214 PR OFFICE/OUTPT VISIT, EST, LEVL IV, 30-39 MIN: ICD-10-PCS | Mod: ,,, | Performed by: OBSTETRICS & GYNECOLOGY

## 2022-11-09 PROCEDURE — 84146 PROLACTIN: ICD-10-PCS | Mod: ,,, | Performed by: CLINICAL MEDICAL LABORATORY

## 2022-11-09 PROCEDURE — 83002 ASSAY OF GONADOTROPIN (LH): CPT | Mod: ,,, | Performed by: CLINICAL MEDICAL LABORATORY

## 2022-11-09 PROCEDURE — 1159F PR MEDICATION LIST DOCUMENTED IN MEDICAL RECORD: ICD-10-PCS | Mod: CPTII,,, | Performed by: OBSTETRICS & GYNECOLOGY

## 2022-11-09 PROCEDURE — 3074F SYST BP LT 130 MM HG: CPT | Mod: CPTII,,, | Performed by: OBSTETRICS & GYNECOLOGY

## 2022-11-09 PROCEDURE — 83001 ASSAY OF GONADOTROPIN (FSH): CPT | Mod: ,,, | Performed by: CLINICAL MEDICAL LABORATORY

## 2022-11-09 PROCEDURE — 85025 CBC WITH DIFFERENTIAL: ICD-10-PCS | Mod: ,,, | Performed by: CLINICAL MEDICAL LABORATORY

## 2022-11-09 PROCEDURE — 36415 COLL VENOUS BLD VENIPUNCTURE: CPT | Mod: ,,, | Performed by: OBSTETRICS & GYNECOLOGY

## 2022-11-09 PROCEDURE — 3074F PR MOST RECENT SYSTOLIC BLOOD PRESSURE < 130 MM HG: ICD-10-PCS | Mod: CPTII,,, | Performed by: OBSTETRICS & GYNECOLOGY

## 2022-11-09 PROCEDURE — 3044F PR MOST RECENT HEMOGLOBIN A1C LEVEL <7.0%: ICD-10-PCS | Mod: CPTII,,, | Performed by: OBSTETRICS & GYNECOLOGY

## 2022-11-09 PROCEDURE — 36415 PR COLLECTION VENOUS BLOOD,VENIPUNCTURE: ICD-10-PCS | Mod: ,,, | Performed by: OBSTETRICS & GYNECOLOGY

## 2022-11-09 PROCEDURE — 81003 URINALYSIS: ICD-10-PCS | Mod: QW,,, | Performed by: CLINICAL MEDICAL LABORATORY

## 2022-11-09 PROCEDURE — 84702 HCG, TOTAL, QUANTITATIVE: ICD-10-PCS | Mod: ,,, | Performed by: CLINICAL MEDICAL LABORATORY

## 2022-11-09 PROCEDURE — 84144 ASSAY OF PROGESTERONE: CPT | Mod: ,,, | Performed by: CLINICAL MEDICAL LABORATORY

## 2022-11-09 PROCEDURE — 82670 ESTRADIOL: ICD-10-PCS | Mod: ,,, | Performed by: CLINICAL MEDICAL LABORATORY

## 2022-11-09 PROCEDURE — 81003 URINALYSIS AUTO W/O SCOPE: CPT | Mod: QW,,, | Performed by: CLINICAL MEDICAL LABORATORY

## 2022-11-09 PROCEDURE — 84702 CHORIONIC GONADOTROPIN TEST: CPT | Mod: ,,, | Performed by: CLINICAL MEDICAL LABORATORY

## 2022-11-09 PROCEDURE — 83001 FOLLICLE STIMULATING HORMONE: ICD-10-PCS | Mod: ,,, | Performed by: CLINICAL MEDICAL LABORATORY

## 2022-11-09 PROCEDURE — 3079F DIAST BP 80-89 MM HG: CPT | Mod: CPTII,,, | Performed by: OBSTETRICS & GYNECOLOGY

## 2022-11-09 PROCEDURE — 82670 ASSAY OF TOTAL ESTRADIOL: CPT | Mod: ,,, | Performed by: CLINICAL MEDICAL LABORATORY

## 2022-11-09 PROCEDURE — 84144 PROGESTERONE: ICD-10-PCS | Mod: ,,, | Performed by: CLINICAL MEDICAL LABORATORY

## 2022-11-09 PROCEDURE — 85025 COMPLETE CBC W/AUTO DIFF WBC: CPT | Mod: ,,, | Performed by: CLINICAL MEDICAL LABORATORY

## 2022-11-09 PROCEDURE — 99214 OFFICE O/P EST MOD 30 MIN: CPT | Mod: ,,, | Performed by: OBSTETRICS & GYNECOLOGY

## 2022-11-10 NOTE — PATIENT INSTRUCTIONS
Dr. Arias Felton thanks you for this office visit at [facility].    Diagnosis for this visit:   Problem List Items Addressed This Visit    None  Visit Diagnoses       History of irregular menstrual cycles    -  Primary    Hot flashes                 The Plan: For recommend repeat gonadotropins and estradiol;hcg; check thyroid panel; follow-up pelvic ultrasound             Return back in 2 weeks for progesterone injection; consider discussion of oral contraception give this patient regular menstrual cycles at that visit.      Please practice best food and exercise habits for your age.    Dr. Arias Felton recommends avoidance of smoking and illicit medications or habits.    Please call  or schedule for any change in your health.     Please keep the next scheduled appointment or call for any need to change the appointment.     Dr. Arias Felton recommends yearly exams for good health maintenance.      Thank you    Dr. Arias Felton  11/09/2022 6:05 PM

## 2022-11-10 NOTE — PROGRESS NOTES
Vishal [facility] female  for   Chief Complaint   Patient presents with    Gynecologic Exam     Irregular menstrual cycle    Hot Flashes      OB History          2    Para   1    Term   1            AB        Living   3         SAB        IAB        Ectopic        Multiple   1    Live Births   1                  HPI:  31-year-old  2, para 2 Afro-American female presents for follow-up.  She is complaining hot flashes that are intermittent.  The patient in July was given a progesterone to induce a cycle since she had secondary amenorrhea since 2021.  The patient did have subsequent irregular cycles-her last menstrual period was 2030; previous cycle was .    Patient is not sexually active.  She has no pelvic pain or other associated symptoms.  Patient denies any breast tenderness.    Past Medical History:   Diagnosis Date    Gout       Past Surgical History:   Procedure Laterality Date    HERNIA REPAIR        Review of patient's allergies indicates:  No Known Allergies     Review of Systems:Pertinent items are noted in HPI.     Physical exam:    /86 (BP Location: Right arm, Patient Position: Sitting)   Pulse 89   Temp 97.6 °F (36.4 °C) (Temporal)   Resp 16   Ht 5' (1.524 m)   Wt 76.2 kg (168 lb)   BMI 32.81 kg/m²      General Appearance: healthy, alert, no distress, smiling, BMI of 32.81    HEENT: Normal exam    Lymphatic: no palpable lymphadenopathy, no hepatosplenomegaly    Chest:           Breasts:  Not performed           Lungs:clear to auscultation bilaterally, normal percussion bilaterally           Heart: regular rate and rhythm, S1, S2 normal, no murmur, click, rub or gallop    Abdomen: soft, non-tender, without masses or organomegaly, normal bowel sounds, without guarding, without rebound, and no hepatosplenomegaly; moderately obese but no change    Pelvic:                    Vulva: normal appearing vulva with no masses, tenderness or lesions                     Cervix: normal appearing cervix without discharge or lesions, multiparous os                    Uterus uterus is normal size, shape, consistency and nontender, anteverted                    Annexa(e): normal adnexa in size, nontender and no masses                   Rectal: normal rectal, no masses.     Extremity: normal, extremities warm, no clubbing, no cyanosis, no edema, non-tender    Skin: normal exam        Assessment:   Problem List Items Addressed This Visit    None  Visit Diagnoses       History of irregular menstrual cycles    -  Primary    Relevant Orders    US Pelvis Comp with Transvag NON-OB (xpd    Hot flashes        Possible premature menopause with early ovarian failure             Plan:  For recommend repeat gonadotropins and estradiol;hcg; check thyroid panel; follow-up pelvic ultrasound; prolactin level             Return back in 2 weeks for progesterone injection; consider discussion of oral contraception in order to control and prevent secondary amenorrhea or oligomenorrhea            Addendum on 11/10/2022:  Gonadotropins are elevated with a very low level of estradiol less than 11 PICA g per mL.  Her prolactin level is normal.  With elevated gonadotropins very unlikely that she has any neurologic procedure within the pituitary gland could be causing a problem.

## 2022-11-23 ENCOUNTER — TELEPHONE (OUTPATIENT)
Dept: OBSTETRICS AND GYNECOLOGY | Facility: CLINIC | Age: 32
End: 2022-11-23
Payer: MEDICAID

## 2022-11-23 NOTE — TELEPHONE ENCOUNTER
Ultrasound rescheduled for 12/7/2022 @ 2:30 and follow up appt rescheduled for 12/7/2022 @ 3:30    Pt aware and agreed to appt

## 2023-05-17 ENCOUNTER — TELEPHONE (OUTPATIENT)
Dept: OBSTETRICS AND GYNECOLOGY | Facility: CLINIC | Age: 33
End: 2023-05-17
Payer: MEDICAID

## 2023-05-17 NOTE — TELEPHONE ENCOUNTER
----- Message from Chen Vallejo sent at 5/15/2023  8:09 AM CDT -----  Regarding: Patient Call  Patient has missed her last two appointments for U/S and two follow up appointments.  She would like you to rescheduled her U/S and call her with the appt date and time.      Return phone call, no answer. Left vm

## 2023-05-24 ENCOUNTER — TELEPHONE (OUTPATIENT)
Dept: OBSTETRICS AND GYNECOLOGY | Facility: CLINIC | Age: 33
End: 2023-05-24
Payer: MEDICAID

## 2023-05-25 NOTE — TELEPHONE ENCOUNTER
----- Message from Arias Felton MD sent at 2023 10:19 AM CDT -----  Regarding: Return phone call to patient  Message  Received: 3 days ago  Noris [facility] Staff  Caller: Unspecified (3 days ago,  8:28 AM)  Pt wants to be called back @ [phone] or [phone]      Return phone call, verified by name and  Appt to be scheduled pt states she will check mychart for  dates/times

## 2023-05-31 ENCOUNTER — OFFICE VISIT (OUTPATIENT)
Dept: OBSTETRICS AND GYNECOLOGY | Facility: CLINIC | Age: 33
End: 2023-05-31
Payer: MEDICAID

## 2023-05-31 VITALS
RESPIRATION RATE: 16 BRPM | TEMPERATURE: 98 F | WEIGHT: 154.38 LBS | SYSTOLIC BLOOD PRESSURE: 116 MMHG | HEART RATE: 62 BPM | HEIGHT: 60 IN | BODY MASS INDEX: 30.31 KG/M2 | DIASTOLIC BLOOD PRESSURE: 77 MMHG

## 2023-05-31 DIAGNOSIS — N76.0 BACTERIAL VAGINOSIS: ICD-10-CM

## 2023-05-31 DIAGNOSIS — B96.89 BACTERIAL VAGINOSIS: ICD-10-CM

## 2023-05-31 DIAGNOSIS — R23.2 HOT FLASHES: ICD-10-CM

## 2023-05-31 DIAGNOSIS — N91.1 SECONDARY AMENORRHEA: Primary | ICD-10-CM

## 2023-05-31 LAB
CANDIDA SPECIES: NEGATIVE
GARDNERELLA: POSITIVE
TRICHOMONAS: NEGATIVE

## 2023-05-31 PROCEDURE — 87660 TRICHOMONAS VAGIN DIR PROBE: CPT | Mod: ,,, | Performed by: CLINICAL MEDICAL LABORATORY

## 2023-05-31 PROCEDURE — 99214 OFFICE O/P EST MOD 30 MIN: CPT | Mod: PBBFAC | Performed by: OBSTETRICS & GYNECOLOGY

## 2023-05-31 PROCEDURE — 87660 BACTERIAL VAGINOSIS: ICD-10-PCS | Mod: ,,, | Performed by: CLINICAL MEDICAL LABORATORY

## 2023-05-31 PROCEDURE — 1160F RVW MEDS BY RX/DR IN RCRD: CPT | Mod: CPTII,,, | Performed by: OBSTETRICS & GYNECOLOGY

## 2023-05-31 PROCEDURE — 87510 BACTERIAL VAGINOSIS: ICD-10-PCS | Mod: ,,, | Performed by: CLINICAL MEDICAL LABORATORY

## 2023-05-31 PROCEDURE — 1159F PR MEDICATION LIST DOCUMENTED IN MEDICAL RECORD: ICD-10-PCS | Mod: CPTII,,, | Performed by: OBSTETRICS & GYNECOLOGY

## 2023-05-31 PROCEDURE — 1159F MED LIST DOCD IN RCRD: CPT | Mod: CPTII,,, | Performed by: OBSTETRICS & GYNECOLOGY

## 2023-05-31 PROCEDURE — 87510 GARDNER VAG DNA DIR PROBE: CPT | Mod: ,,, | Performed by: CLINICAL MEDICAL LABORATORY

## 2023-05-31 PROCEDURE — 3078F DIAST BP <80 MM HG: CPT | Mod: CPTII,,, | Performed by: OBSTETRICS & GYNECOLOGY

## 2023-05-31 PROCEDURE — 87480 BACTERIAL VAGINOSIS: ICD-10-PCS | Mod: ,,, | Performed by: CLINICAL MEDICAL LABORATORY

## 2023-05-31 PROCEDURE — 3008F PR BODY MASS INDEX (BMI) DOCUMENTED: ICD-10-PCS | Mod: CPTII,,, | Performed by: OBSTETRICS & GYNECOLOGY

## 2023-05-31 PROCEDURE — 3008F BODY MASS INDEX DOCD: CPT | Mod: CPTII,,, | Performed by: OBSTETRICS & GYNECOLOGY

## 2023-05-31 PROCEDURE — 87480 CANDIDA DNA DIR PROBE: CPT | Mod: ,,, | Performed by: CLINICAL MEDICAL LABORATORY

## 2023-05-31 PROCEDURE — 3078F PR MOST RECENT DIASTOLIC BLOOD PRESSURE < 80 MM HG: ICD-10-PCS | Mod: CPTII,,, | Performed by: OBSTETRICS & GYNECOLOGY

## 2023-05-31 PROCEDURE — 1160F PR REVIEW ALL MEDS BY PRESCRIBER/CLIN PHARMACIST DOCUMENTED: ICD-10-PCS | Mod: CPTII,,, | Performed by: OBSTETRICS & GYNECOLOGY

## 2023-05-31 PROCEDURE — 3074F PR MOST RECENT SYSTOLIC BLOOD PRESSURE < 130 MM HG: ICD-10-PCS | Mod: CPTII,,, | Performed by: OBSTETRICS & GYNECOLOGY

## 2023-05-31 PROCEDURE — 3074F SYST BP LT 130 MM HG: CPT | Mod: CPTII,,, | Performed by: OBSTETRICS & GYNECOLOGY

## 2023-05-31 PROCEDURE — 99215 PR OFFICE/OUTPT VISIT, EST, LEVL V, 40-54 MIN: ICD-10-PCS | Mod: S$PBB,,, | Performed by: OBSTETRICS & GYNECOLOGY

## 2023-05-31 PROCEDURE — 99215 OFFICE O/P EST HI 40 MIN: CPT | Mod: S$PBB,,, | Performed by: OBSTETRICS & GYNECOLOGY

## 2023-05-31 NOTE — PATIENT INSTRUCTIONS
Dr. Arias Felton thanks you for this office visit at [facility].    Diagnosis for this visit:   Problem List Items Addressed This Visit    None  Visit Diagnoses       Secondary amenorrhea    -  Primary    Hot flashes                 The Plan:  Screening labs; gonadotropins; pelvic ultrasound; affirm study             Follow-up in 1 month        Please practice best food and exercise habits for your age.    Dr. Arias Felton recommends avoidance of smoking and illicit medications or habits.    Please call  or schedule for any change in your health.     Please keep the next scheduled appointment or call for any need to change the appointment.     Dr. Arias Felton recommends yearly exams for good health maintenance.      Thank you    Dr. Arias Felton  05/31/2023 3:41 PM

## 2023-05-31 NOTE — PROGRESS NOTES
Vishal [facility] female  for   Chief Complaint   Patient presents with    Absent Menses     No menses since 10/22/2022      OB History          2    Para   1    Term   1            AB        Living   3         SAB        IAB        Ectopic        Multiple   1    Live Births   1                  HPI:  Patient presents with a complaint of no menses since 2022.  She is had for almost 2 years oligomenorrhea and secondary amenorrhea.  Patient has had hot flashes though she relates recently hot flashes are decreasing in intensity.  Patient is sexually active but denies dyspareunia.    Patient is requesting BV check.    Past Medical History:   Diagnosis Date    Gout       Past Surgical History:   Procedure Laterality Date    HERNIA REPAIR        Review of patient's allergies indicates:  No Known Allergies     Review of Systems:Pertinent items are noted in HPI.     Physical exam:    /77   Pulse 62   Temp 97.7 °F (36.5 °C)   Resp 16   Ht 5' (1.524 m)   Wt 70 kg (154 lb 6.4 oz)   BMI 30.15 kg/m²      General Appearance: healthy, alert, no distress    HEENT: Normal exam    Lymphatic: no palpable lymphadenopathy, no hepatosplenomegaly    Chest:           Breasts:Normal breast tissue bilaterally           Lungs:clear to auscultation bilaterally, normal percussion bilaterally           Heart: regular rate and rhythm, S1, S2 normal, no murmur, click, rub or gallop    Abdomen: soft, non-tender, without masses or organomegaly, normal bowel sounds, without guarding, and without rebound    Pelvic:                    Vulva: normal appearing vulva with no masses, tenderness or lesions                    Cervix: normal appearing cervix without discharge or lesions, multiparous os                    Uterus: uterus is normal size, shape, consistency and nontender, mobile                    Annexa(e): normal adnexa in size, nontender and no masses                   Rectal: normal rectal, no masses.      Extremity: normal    Skin: normal exam    Psych and neurologic exam: WNL                Assessment:   Problem List Items Addressed This Visit    None  Visit Diagnoses       Secondary amenorrhea    -  Primary    Relevant Orders    CBC Auto Differential (Completed)    Estradiol (Completed)    Follicle Stimulating Hormone (Completed)    Luteinizing Hormone (Completed)    Progesterone (Completed)    Bacterial Vaginosis (Completed)    Thyroid Panel (Completed)    US Pelvis Comp with Transvag NON-OB (xpd    Hot flashes        Relevant Orders    CBC Auto Differential (Completed)    Estradiol (Completed)    Follicle Stimulating Hormone (Completed)    Luteinizing Hormone (Completed)    Progesterone (Completed)    Bacterial Vaginosis (Completed)    Thyroid Panel (Completed)    Bacterial vaginosis        Confirmed on affirm study             Plan:  Screening labs; gonadotropins; pelvic ultrasound; affirm study             Follow-up in 1 month               Addendum on 06/01/2023:  Patient's affirm test is positive for bacterial vaginosis.  She will be treated with Flagyl and recheck at her follow-up in next month.

## 2023-06-01 ENCOUNTER — HOSPITAL ENCOUNTER (OUTPATIENT)
Dept: RADIOLOGY | Facility: HOSPITAL | Age: 33
Discharge: HOME | End: 2023-06-01
Attending: OBSTETRICS & GYNECOLOGY
Payer: MEDICAID

## 2023-06-01 ENCOUNTER — TELEPHONE (OUTPATIENT)
Dept: OBSTETRICS AND GYNECOLOGY | Facility: CLINIC | Age: 33
End: 2023-06-01
Payer: MEDICAID

## 2023-06-01 DIAGNOSIS — N91.1 SECONDARY AMENORRHEA: ICD-10-CM

## 2023-06-01 PROCEDURE — 76856 US EXAM PELVIC COMPLETE: CPT | Mod: 26,,, | Performed by: STUDENT IN AN ORGANIZED HEALTH CARE EDUCATION/TRAINING PROGRAM

## 2023-06-01 PROCEDURE — 76830 TRANSVAGINAL US NON-OB: CPT | Mod: 26,,, | Performed by: STUDENT IN AN ORGANIZED HEALTH CARE EDUCATION/TRAINING PROGRAM

## 2023-06-01 PROCEDURE — 76830 US PELVIS COMP WITH TRANSVAG NON-OB (XPD): ICD-10-PCS | Mod: 26,,, | Performed by: STUDENT IN AN ORGANIZED HEALTH CARE EDUCATION/TRAINING PROGRAM

## 2023-06-01 PROCEDURE — 76856 US PELVIS COMP WITH TRANSVAG NON-OB (XPD): ICD-10-PCS | Mod: 26,,, | Performed by: STUDENT IN AN ORGANIZED HEALTH CARE EDUCATION/TRAINING PROGRAM

## 2023-06-01 PROCEDURE — 76856 US EXAM PELVIC COMPLETE: CPT | Mod: TC

## 2023-06-01 RX ORDER — METRONIDAZOLE 500 MG/1
500 TABLET ORAL 3 TIMES DAILY
Qty: 42 TABLET | Refills: 0 | Status: SHIPPED | OUTPATIENT
Start: 2023-06-01 | End: 2023-06-15

## 2023-06-01 NOTE — TELEPHONE ENCOUNTER
Per Dr. Felton    Call patient    Advise her that the affirm study is positive fo bacterial vaginosis and Dr. Felton has sent her pharmacy a prescription for Flagyl.    Verified by name and  Reviewed lab results and recommended treatment. Voiced understanding

## 2023-07-19 ENCOUNTER — PATIENT MESSAGE (OUTPATIENT)
Dept: RESEARCH | Facility: HOSPITAL | Age: 33
End: 2023-07-19

## 2023-11-03 ENCOUNTER — HOSPITAL ENCOUNTER (EMERGENCY)
Facility: HOSPITAL | Age: 33
Discharge: HOME | End: 2023-11-03
Payer: MEDICAID

## 2023-11-03 VITALS
WEIGHT: 160 LBS | HEIGHT: 60 IN | TEMPERATURE: 102 F | SYSTOLIC BLOOD PRESSURE: 118 MMHG | DIASTOLIC BLOOD PRESSURE: 73 MMHG | OXYGEN SATURATION: 99 % | HEART RATE: 104 BPM | BODY MASS INDEX: 31.41 KG/M2 | RESPIRATION RATE: 18 BRPM

## 2023-11-03 DIAGNOSIS — J02.9 PHARYNGITIS, UNSPECIFIED ETIOLOGY: Primary | ICD-10-CM

## 2023-11-03 DIAGNOSIS — J02.9 SORE THROAT: ICD-10-CM

## 2023-11-03 DIAGNOSIS — R50.9 FEVER, UNSPECIFIED FEVER CAUSE: ICD-10-CM

## 2023-11-03 DIAGNOSIS — R51.9 NONINTRACTABLE HEADACHE, UNSPECIFIED CHRONICITY PATTERN, UNSPECIFIED HEADACHE TYPE: ICD-10-CM

## 2023-11-03 PROCEDURE — 96372 THER/PROPH/DIAG INJ SC/IM: CPT

## 2023-11-03 PROCEDURE — 99284 EMERGENCY DEPT VISIT MOD MDM: CPT

## 2023-11-03 PROCEDURE — 25000003 PHARM REV CODE 250

## 2023-11-03 PROCEDURE — 63600175 PHARM REV CODE 636 W HCPCS

## 2023-11-03 PROCEDURE — 99284 EMERGENCY DEPT VISIT MOD MDM: CPT | Mod: ,,,

## 2023-11-03 PROCEDURE — 99284 PR EMERGENCY DEPT VISIT,LEVEL IV: ICD-10-PCS | Mod: ,,,

## 2023-11-03 RX ORDER — CEFDINIR 300 MG/1
300 CAPSULE ORAL 2 TIMES DAILY
Qty: 20 CAPSULE | Refills: 0 | Status: SHIPPED | OUTPATIENT
Start: 2023-11-03 | End: 2023-11-13

## 2023-11-03 RX ORDER — CEFTRIAXONE 1 G/1
1 INJECTION, POWDER, FOR SOLUTION INTRAMUSCULAR; INTRAVENOUS
Status: COMPLETED | OUTPATIENT
Start: 2023-11-03 | End: 2023-11-03

## 2023-11-03 RX ORDER — ACETAMINOPHEN 500 MG
1000 TABLET ORAL
Status: COMPLETED | OUTPATIENT
Start: 2023-11-03 | End: 2023-11-03

## 2023-11-03 RX ADMIN — ACETAMINOPHEN 1000 MG: 500 TABLET ORAL at 03:11

## 2023-11-03 RX ADMIN — CEFTRIAXONE SODIUM 1 G: 1 INJECTION, POWDER, FOR SOLUTION INTRAMUSCULAR; INTRAVENOUS at 03:11

## 2023-11-03 NOTE — ED PROVIDER NOTES
Encounter Date: 11/3/2023       History     Chief Complaint   Patient presents with    Sore Throat    Headache     Patient comes in with complaints of sore throat, headache and body aches. Patient states her children were seen in this er last night dx with Strep     Patient is a 32-year-old black female who presents to the emergency department with complaints of sore throat and a headache over the past 1-2 days.  She also reports that her children are currently being treated for strep throat.  She denies any cough.  Her temperature is noted to be 101.5 while in the emergency department today.  She is had no prior treatment before this emergency room visit.  She is tolerating p.o. well.  Her only past medical history is noted to be gout.  She does not take any daily medications.  She denies any cough, chest pain, shortness of breath, difficulty swallowing, or any other complaints.  She is resting comfortably at the time of the exam.  She appears in no acute distress.    The history is provided by the patient.      Review of patient's allergies indicates:  No Known Allergies  Past Medical History:   Diagnosis Date    Gout      Past Surgical History:   Procedure Laterality Date    HERNIA REPAIR       Family History   Problem Relation Age of Onset    Hypertension Mother     Diabetes Mother     Breast cancer Sister     Breast cancer Paternal Aunt      Social History     Tobacco Use    Smoking status: Never    Smokeless tobacco: Never   Substance Use Topics    Alcohol use: Yes    Drug use: Never     Review of Systems   Constitutional:  Positive for appetite change (decreased), chills and fever. Negative for activity change and fatigue.   HENT:  Positive for sore throat. Negative for congestion, ear discharge, ear pain, facial swelling, rhinorrhea, sneezing and trouble swallowing.    Respiratory:  Negative for cough and shortness of breath.    Cardiovascular:  Negative for chest pain and palpitations.   Gastrointestinal:   Negative for abdominal pain, diarrhea, nausea and vomiting.   Genitourinary:  Negative for dysuria and frequency.   Musculoskeletal:  Positive for arthralgias and myalgias. Negative for back pain, neck pain and neck stiffness.   Skin:  Negative for color change, pallor and rash.   Neurological:  Positive for headaches. Negative for dizziness, syncope, facial asymmetry, speech difficulty, weakness, light-headedness and numbness.   Psychiatric/Behavioral:  Negative for confusion. The patient is not nervous/anxious.    All other systems reviewed and are negative.      Physical Exam     Initial Vitals [11/03/23 1524]   BP Pulse Resp Temp SpO2   118/73 (!) 114 18 (!) 101.5 °F (38.6 °C) 99 %      MAP       --         Physical Exam    Nursing note and vitals reviewed.  Constitutional: She appears well-developed and well-nourished. No distress.   HENT:   Head: Normocephalic and atraumatic.   Mouth/Throat: Uvula is midline and mucous membranes are normal. No uvula swelling. Oropharyngeal exudate and posterior oropharyngeal erythema present. No posterior oropharyngeal edema or tonsillar abscesses.   Eyes: Conjunctivae and EOM are normal. Pupils are equal, round, and reactive to light.   Neck: Neck supple.   Normal range of motion.  Cardiovascular:  Regular rhythm and normal heart sounds.   Tachycardia present.         Pulmonary/Chest: Breath sounds normal. No respiratory distress. She has no wheezes. She has no rhonchi. She has no rales. She exhibits no tenderness.   Abdominal: Abdomen is soft. Bowel sounds are normal. She exhibits no distension. There is no abdominal tenderness.   Musculoskeletal:         General: Normal range of motion.      Cervical back: Normal range of motion and neck supple.     Lymphadenopathy:     She has cervical adenopathy.   Neurological: She is alert and oriented to person, place, and time. She has normal strength. GCS score is 15. GCS eye subscore is 4. GCS verbal subscore is 5. GCS motor  subscore is 6.   Skin: Skin is warm and dry. Capillary refill takes less than 2 seconds.   Psychiatric: She has a normal mood and affect. Her behavior is normal. Judgment and thought content normal.         Medical Screening Exam   See Full Note    ED Course   Procedures  Labs Reviewed - No data to display       Imaging Results    None          Medications   cefTRIAXone injection 1 g (1 g Intramuscular Given 11/3/23 1542)   acetaminophen tablet 1,000 mg (1,000 mg Oral Given 11/3/23 1542)     Medical Decision Making  Patient presents for evaluation of fever, sore throat, and headache.  She is currently treating her children for strep throat.  She is swallowing without difficulty.  She is febrile during this emergency room visit and mildly tachycardic.  We will treat her with Tylenol 1000 mg p.o..  We will also administer Rocephin 1 g IM while in the emergency department and follow with a 10 day course of Omnicef.  She is tolerating p.o..  She is nontoxic in appearance.  It was recommended that she alternate Tylenol and ibuprofen over-the-counter for pain and fever control.  She was also instructed to increase fluids to maintain proper hydration.  It was recommended that she follow up with the primary care provider of her choice in 1-2 days for a recheck.  We will place the patient on antibiotic therapy and it was explained to her that she needed to complete all antibiotics even if she feels better.  She was instructed to return to the emergency department for any new or worrisome symptoms.  She verbalizes understanding and is agreement with this plan.    Amount and/or Complexity of Data Reviewed  Independent Historian:      Details: Patient is a 32-year-old black female who presents to the emergency department with complaints of sore throat and a headache over the past 1-2 days.  She also reports that her children are currently being treated for strep throat.  She denies any cough.  Her temperature is noted to be  101.5 while in the emergency department today.  She is had no prior treatment before this emergency room visit.  She is tolerating p.o. well.  Her only past medical history is noted to be gout.  She does not take any daily medications.  She denies any cough, chest pain, shortness of breath, difficulty swallowing, or any other complaints.  She is resting comfortably at the time of the exam.  She appears in no acute distress.    Risk  OTC drugs.  Prescription drug management.  Risk Details: The presentation of Vishal Pennington is consistent with streptococcal pharyngitis based on sufficient Centor Criteria and/or positive rapid diagnostic testing. The presentation of Vishal Pennington is NOT consistent with airway compromising conditions such as but not limited to retropharyngeal abscess, peritonsillar abscess, or epiglottitis.    Upon discharge, Vishal Pennington has no evidence of respiratory failure and is comfortable without respiratory distress. Additionally, Vishal Pennington has no evidence of airway compromise and is speaking in full/complete sentences without difficulty. Vishal Pennington was managing are respiratory/airway secretions at time of discharge. Vishal Pennington meets outpatient treatment criteria.    Data Reviewed/Counseling: I have reviewed the patient's vital signs, nursing notes, and other relevant ancillary testing/information. I have had a detailed discussion with the patient regarding the historical points, examination findings, and any diagnostic results. I have also discussed the need for outpatient follow-up. Antibiotics were prescribed in accordance with Centor Criteria and/or positive diagnostic testing.    Vishal Pennington has been counseled to return to the Emergency Department if symptoms worsen in particular if they develop difficulty breathing, difficulty swallowing, uncontrolled pain, airway compromise, or if there are any questions or concerns that  arise while at home.     Dx: Pharyngitis/ Headache/ Sore Throat/ Fever                               Clinical Impression:   Final diagnoses:  [J02.9] Pharyngitis, unspecified etiology (Primary)  [R51.9] Nonintractable headache, unspecified chronicity pattern, unspecified headache type  [J02.9] Sore throat  [R50.9] Fever, unspecified fever cause        ED Disposition Condition    Discharge Stable          ED Prescriptions       Medication Sig Dispense Start Date End Date Auth. Provider    cefdinir (OMNICEF) 300 MG capsule Take 1 capsule (300 mg total) by mouth 2 (two) times daily. for 10 days 20 capsule 11/3/2023 11/13/2023 Gio Santoro FNP          Follow-up Information    None          Gio Santoro, DEREK  11/03/23 1928

## 2023-11-03 NOTE — DISCHARGE INSTRUCTIONS
Take all antibiotics even if you feel better.  Alternate Tylenol and ibuprofen over-the-counter as directed every 4 hours for pain and fever control.    Increase fluids to maintain proper hydration.    Arrange for a follow up in 1-2 days with the primary care provider of your choice for a recheck.    Return to emergency department for any new or worrisome symptoms.

## 2023-11-03 NOTE — Clinical Note
"Vishal Barksdale" Eduardo Pennington was seen and treated in our emergency department on 11/3/2023.  She may return to work on 11/05/2023.       If you have any questions or concerns, please don't hesitate to call.      Gio Santoro, FNP"

## 2023-12-18 ENCOUNTER — OFFICE VISIT (OUTPATIENT)
Dept: OBSTETRICS AND GYNECOLOGY | Facility: CLINIC | Age: 33
End: 2023-12-18
Payer: MEDICAID

## 2023-12-18 ENCOUNTER — PATIENT MESSAGE (OUTPATIENT)
Dept: OBSTETRICS AND GYNECOLOGY | Facility: CLINIC | Age: 33
End: 2023-12-18
Payer: MEDICAID

## 2023-12-18 VITALS
OXYGEN SATURATION: 98 % | DIASTOLIC BLOOD PRESSURE: 84 MMHG | TEMPERATURE: 99 F | SYSTOLIC BLOOD PRESSURE: 109 MMHG | HEART RATE: 80 BPM | RESPIRATION RATE: 18 BRPM | BODY MASS INDEX: 32.12 KG/M2 | HEIGHT: 60 IN | WEIGHT: 163.63 LBS

## 2023-12-18 DIAGNOSIS — N89.8 VAGINAL DISCHARGE: ICD-10-CM

## 2023-12-18 DIAGNOSIS — E55.9 VITAMIN D DEFICIENCY: ICD-10-CM

## 2023-12-18 DIAGNOSIS — N76.0 BACTERIAL VAGINOSIS: ICD-10-CM

## 2023-12-18 DIAGNOSIS — B96.89 BACTERIAL VAGINOSIS: ICD-10-CM

## 2023-12-18 DIAGNOSIS — N91.1 AMENORRHEA, SECONDARY: Primary | ICD-10-CM

## 2023-12-18 DIAGNOSIS — D36.9 PAPILLOMA: ICD-10-CM

## 2023-12-18 LAB
BILIRUB UR QL STRIP: NEGATIVE
CANDIDA SPECIES: NEGATIVE
CLARITY UR: CLEAR
COLOR UR: COLORLESS
GARDNERELLA: POSITIVE
GLUCOSE UR STRIP-MCNC: NORMAL MG/DL
KETONES UR STRIP-SCNC: NEGATIVE MG/DL
LEUKOCYTE ESTERASE UR QL STRIP: NEGATIVE
MUCOUS, UA: ABNORMAL /LPF
NITRITE UR QL STRIP: NEGATIVE
PH UR STRIP: 5.5 PH UNITS
PROT UR QL STRIP: NEGATIVE
RBC # UR STRIP: NEGATIVE /UL
RBC #/AREA URNS HPF: 3 /HPF
SP GR UR STRIP: 1.01
SQUAMOUS #/AREA URNS LPF: ABNORMAL /HPF
TRICHOMONAS: NEGATIVE
UROBILINOGEN UR STRIP-ACNC: NORMAL MG/DL
WBC #/AREA URNS HPF: 2 /HPF

## 2023-12-18 PROCEDURE — 3008F PR BODY MASS INDEX (BMI) DOCUMENTED: ICD-10-PCS | Mod: CPTII,,, | Performed by: OBSTETRICS & GYNECOLOGY

## 2023-12-18 PROCEDURE — 87510 GARDNER VAG DNA DIR PROBE: CPT | Mod: ,,, | Performed by: CLINICAL MEDICAL LABORATORY

## 2023-12-18 PROCEDURE — 3079F DIAST BP 80-89 MM HG: CPT | Mod: CPTII,,, | Performed by: OBSTETRICS & GYNECOLOGY

## 2023-12-18 PROCEDURE — 87591 N.GONORRHOEAE DNA AMP PROB: CPT | Mod: ,,, | Performed by: CLINICAL MEDICAL LABORATORY

## 2023-12-18 PROCEDURE — 87591 CHLAMYDIA/GC, PCR (THINPREP): ICD-10-PCS | Mod: ,,, | Performed by: CLINICAL MEDICAL LABORATORY

## 2023-12-18 PROCEDURE — 87660 BACTERIAL VAGINOSIS: ICD-10-PCS | Mod: ,,, | Performed by: CLINICAL MEDICAL LABORATORY

## 2023-12-18 PROCEDURE — 99215 PR OFFICE/OUTPT VISIT, EST, LEVL V, 40-54 MIN: ICD-10-PCS | Mod: S$PBB,,, | Performed by: OBSTETRICS & GYNECOLOGY

## 2023-12-18 PROCEDURE — 87491 CHLMYD TRACH DNA AMP PROBE: CPT | Mod: ,,, | Performed by: CLINICAL MEDICAL LABORATORY

## 2023-12-18 PROCEDURE — 3074F SYST BP LT 130 MM HG: CPT | Mod: CPTII,,, | Performed by: OBSTETRICS & GYNECOLOGY

## 2023-12-18 PROCEDURE — 3079F PR MOST RECENT DIASTOLIC BLOOD PRESSURE 80-89 MM HG: ICD-10-PCS | Mod: CPTII,,, | Performed by: OBSTETRICS & GYNECOLOGY

## 2023-12-18 PROCEDURE — 87480 BACTERIAL VAGINOSIS: ICD-10-PCS | Mod: ,,, | Performed by: CLINICAL MEDICAL LABORATORY

## 2023-12-18 PROCEDURE — 1159F MED LIST DOCD IN RCRD: CPT | Mod: CPTII,,, | Performed by: OBSTETRICS & GYNECOLOGY

## 2023-12-18 PROCEDURE — 81001 URINALYSIS AUTO W/SCOPE: CPT | Mod: ,,, | Performed by: CLINICAL MEDICAL LABORATORY

## 2023-12-18 PROCEDURE — 87510 BACTERIAL VAGINOSIS: ICD-10-PCS | Mod: ,,, | Performed by: CLINICAL MEDICAL LABORATORY

## 2023-12-18 PROCEDURE — 87624 HUMAN PAPILLOMAVIRUS (HPV): ICD-10-PCS | Mod: ,,, | Performed by: CLINICAL MEDICAL LABORATORY

## 2023-12-18 PROCEDURE — 96372 THER/PROPH/DIAG INJ SC/IM: CPT | Mod: PBBFAC | Performed by: OBSTETRICS & GYNECOLOGY

## 2023-12-18 PROCEDURE — 1159F PR MEDICATION LIST DOCUMENTED IN MEDICAL RECORD: ICD-10-PCS | Mod: CPTII,,, | Performed by: OBSTETRICS & GYNECOLOGY

## 2023-12-18 PROCEDURE — 99214 OFFICE O/P EST MOD 30 MIN: CPT | Mod: PBBFAC | Performed by: OBSTETRICS & GYNECOLOGY

## 2023-12-18 PROCEDURE — 1160F RVW MEDS BY RX/DR IN RCRD: CPT | Mod: CPTII,,, | Performed by: OBSTETRICS & GYNECOLOGY

## 2023-12-18 PROCEDURE — 87660 TRICHOMONAS VAGIN DIR PROBE: CPT | Mod: ,,, | Performed by: CLINICAL MEDICAL LABORATORY

## 2023-12-18 PROCEDURE — 3074F PR MOST RECENT SYSTOLIC BLOOD PRESSURE < 130 MM HG: ICD-10-PCS | Mod: CPTII,,, | Performed by: OBSTETRICS & GYNECOLOGY

## 2023-12-18 PROCEDURE — 88142 CYTOPATH C/V THIN LAYER: CPT | Mod: TC,GCY | Performed by: OBSTETRICS & GYNECOLOGY

## 2023-12-18 PROCEDURE — 87480 CANDIDA DNA DIR PROBE: CPT | Mod: ,,, | Performed by: CLINICAL MEDICAL LABORATORY

## 2023-12-18 PROCEDURE — 1160F PR REVIEW ALL MEDS BY PRESCRIBER/CLIN PHARMACIST DOCUMENTED: ICD-10-PCS | Mod: CPTII,,, | Performed by: OBSTETRICS & GYNECOLOGY

## 2023-12-18 PROCEDURE — 99999PBSHW PR PBB SHADOW TECHNICAL ONLY FILED TO HB: ICD-10-PCS | Mod: PBBFAC,,,

## 2023-12-18 PROCEDURE — 3008F BODY MASS INDEX DOCD: CPT | Mod: CPTII,,, | Performed by: OBSTETRICS & GYNECOLOGY

## 2023-12-18 PROCEDURE — 99999PBSHW PR PBB SHADOW TECHNICAL ONLY FILED TO HB: Mod: PBBFAC,,,

## 2023-12-18 PROCEDURE — 81001 URINALYSIS: ICD-10-PCS | Mod: ,,, | Performed by: CLINICAL MEDICAL LABORATORY

## 2023-12-18 PROCEDURE — 99215 OFFICE O/P EST HI 40 MIN: CPT | Mod: S$PBB,,, | Performed by: OBSTETRICS & GYNECOLOGY

## 2023-12-18 PROCEDURE — 87491 CHLAMYDIA/GC, PCR (THINPREP): ICD-10-PCS | Mod: ,,, | Performed by: CLINICAL MEDICAL LABORATORY

## 2023-12-18 PROCEDURE — 87624 HPV HI-RISK TYP POOLED RSLT: CPT | Mod: ,,, | Performed by: CLINICAL MEDICAL LABORATORY

## 2023-12-18 RX ORDER — PROGESTERONE 50 MG/ML
100 INJECTION, SOLUTION INTRAMUSCULAR ONCE
Status: COMPLETED | OUTPATIENT
Start: 2023-12-18 | End: 2023-12-18

## 2023-12-18 RX ADMIN — PROGESTERONE 100 MG: 50 INJECTION, SOLUTION INTRAMUSCULAR at 04:12

## 2023-12-18 NOTE — PROGRESS NOTES
Vishal Clark [place] female  for   Chief Complaint   Patient presents with    Annual Exam     Patient is here for an annual exam, added she is also having some issues with discharge however there is no smell, odor or abnormal color to it.   Added she stated still not having a menstrual cycle in 2 years, last time Dr. Felton had given her an injection for her cycle causing it to start 1 month later however it did not last.       OB History          2    Para   1    Term   1            AB        Living   3         SAB        IAB        Ectopic        Multiple   1    Live Births   1                  HPI:  Patient complaining of secondary amenorrhea for least 2 years.  She has had a positive withdrawal bleeding with her last progesterone injection.  Patient denies hot flashes.  The patient states she is not sexually active.  She is complaining of slight vaginal discharge but there is no odor and there is no vaginal symptoms.  She has no urologic symptoms.    She has no breast discharge from the nipples.    She is complaining of a irritation of a lesion on her back along her bra line-lower bra on her left side.    Past Medical History:   Diagnosis Date    Gout       Past Surgical History:   Procedure Laterality Date    HERNIA REPAIR        Review of patient's allergies indicates:  No Known Allergies     Review of Systems:  Refer to HPI    Physical exam:    /84 (BP Location: Left arm, Patient Position: Sitting)   Pulse 80   Temp 98.5 °F (36.9 °C)   Resp 18   Ht 5' (1.524 m)   Wt 74.2 kg (163 lb 9.6 oz)   LMP  (LMP Unknown)   SpO2 98%   BMI 31.95 kg/m²      General Appearance: healthy, alert, no distress, smiling    HEENT: Normal exam    Lymphatic: no palpable lymphadenopathy, no hepatosplenomegaly    Chest:           Breasts:No dimpling, nipple retraction or discharge. No masses or nodes., Normal to inspection, Normal breast tissue bilaterally, and no galactorrhea bilaterally; very large  breasts bilaterally; irritated papilloma beneath beneath the bra strap on her back on the left that is irritated.           Lungs:clear to auscultation bilaterally           Heart: regular rate and rhythm, S1, S2 normal, no murmur, click, rub or gallop    Abdomen: soft, non-tender, without masses or organomegaly, normal bowel sounds, nontender, without guarding, and without rebound    Pelvic:                    Vulva: normal appearing vulva with no masses, tenderness or lesions                    Cervix: normal appearing cervix without discharge or lesions, multiparous os, friable with contact bleeding                    Vaginal speculum exam reveals a nondescript discharge and there is no odor                    Uterus:  Easily palpable and anteflexed mobile and nontender                   Annexa(e): normal adnexa in size, nontender and no masses                   Rectal: normal rectal, no masses.     Extremity: normal    Skin: normal exam except for the papilloma on a stalk beneath the bra strap on the left back    Psych and neurologic exam: WNL                Assessment:   Problem List Items Addressed This Visit    None  Visit Diagnoses       Amenorrhea, secondary    -  Primary    Relevant Orders    ThinPrep Pap Test    CBC Auto Differential (Completed)    Comprehensive Metabolic Panel (Completed)    Estradiol (Completed)    Follicle Stimulating Hormone (Completed)    Luteinizing Hormone (Completed)    Progesterone (Completed)    Sedimentation Rate (Completed)    Vitamin D (Completed)    Urinalysis (Completed)    Hemoglobin A1C (Completed)    Bacterial Vaginosis (Completed)    US Pelvis Comp with Transvag NON-OB (xpd    Prolactin (Completed)    Thyroid Panel (Completed)    Type & Screen (Completed)    Urinalysis, Microscopic (Completed)    Vaginal discharge        Relevant Orders    ThinPrep Pap Test    CBC Auto Differential (Completed)    Comprehensive Metabolic Panel (Completed)    Estradiol (Completed)     Follicle Stimulating Hormone (Completed)    Luteinizing Hormone (Completed)    Progesterone (Completed)    Sedimentation Rate (Completed)    Vitamin D (Completed)    Urinalysis (Completed)    Hemoglobin A1C (Completed)    Bacterial Vaginosis (Completed)    US Pelvis Comp with Transvag NON-OB (xpd    Prolactin (Completed)    Thyroid Panel (Completed)    Urinalysis, Microscopic (Completed)    Papilloma        Left back on a stalk along her  bra line    Relevant Orders    ThinPrep Pap Test    CBC Auto Differential (Completed)    Comprehensive Metabolic Panel (Completed)    Estradiol (Completed)    Follicle Stimulating Hormone (Completed)    Luteinizing Hormone (Completed)    Progesterone (Completed)    Sedimentation Rate (Completed)    Vitamin D (Completed)    Urinalysis (Completed)    Hemoglobin A1C (Completed)    Bacterial Vaginosis (Completed)    Thyroid Panel (Completed)    Type & Screen (Completed)    Urinalysis, Microscopic (Completed)    Vitamin D deficiency        Relevant Orders    ThinPrep Pap Test    CBC Auto Differential (Completed)    Comprehensive Metabolic Panel (Completed)    Estradiol (Completed)    Follicle Stimulating Hormone (Completed)    Luteinizing Hormone (Completed)    Progesterone (Completed)    Sedimentation Rate (Completed)    Vitamin D (Completed)    Urinalysis (Completed)    Hemoglobin A1C (Completed)    Bacterial Vaginosis (Completed)    Thyroid Panel (Completed)    Urinalysis, Microscopic (Completed)    Bacterial vaginosis        Confirmed on 12/18/2023             Plan:  Screening labs; gonadotropins; prolactin level; thyroid panel, vitamin-D check; pelvic ultrasound; affirm study; recommend progesterone challenge test today; resect the papilloma on a stalk that is irritating -located along her left back.  This needs be scheduled as an office outpatient surgical resection.               Addendum on 12/18/2023 at 5:55 p.m.:  Patient had requested as she left the visit for a blood group  and Rh.  This was ordered at her request.              Her blood group and Rh is B+.              Addendum on 12/19/2023:  Bacterial vaginosis reconfirmed by affirm study.  Flagyl be sent to her for use.

## 2023-12-18 NOTE — PATIENT INSTRUCTIONS
Dr. Arias Felton thanks you for this office visit at [facility].    Diagnosis for this visit:   Problem List Items Addressed This Visit    None  Visit Diagnoses       Amenorrhea, secondary    -  Primary    Vaginal discharge        Papilloma        Left back on a stalk along her  bra line    Vitamin D deficiency                 The Plan:Screening labs; gonadotropins; prolactin level; thyroid panel, vitamin-D check; pelvic ultrasound; affirm study; recommend progesterone challenge test today; resect the papilloma on a stalk that is irritating.  This needs be scheduled as an office outpatient surgical resection..      Please practice best food and exercise habits for your age.    Dr. Arias Felton recommends avoidance of smoking and illicit medications or habits.    Please call  or schedule for any change in your health.     Please keep the next scheduled appointment or call for any need to change the appointment.     Dr. Arias Felton recommends yearly exams for good health maintenance.      Thank you    Dr. Arias Felton  12/18/2023 3:56 PM

## 2023-12-19 ENCOUNTER — PATIENT MESSAGE (OUTPATIENT)
Dept: OBSTETRICS AND GYNECOLOGY | Facility: CLINIC | Age: 33
End: 2023-12-19
Payer: MEDICAID

## 2023-12-19 RX ORDER — METRONIDAZOLE 500 MG/1
500 TABLET ORAL 3 TIMES DAILY
Qty: 42 TABLET | Refills: 0 | Status: SHIPPED | OUTPATIENT
Start: 2023-12-19 | End: 2024-01-02

## 2023-12-21 ENCOUNTER — HOSPITAL ENCOUNTER (OUTPATIENT)
Dept: RADIOLOGY | Facility: HOSPITAL | Age: 33
Discharge: HOME | End: 2023-12-21
Attending: OBSTETRICS & GYNECOLOGY
Payer: MEDICAID

## 2023-12-21 DIAGNOSIS — N89.8 VAGINAL DISCHARGE: ICD-10-CM

## 2023-12-21 DIAGNOSIS — N91.1 AMENORRHEA, SECONDARY: ICD-10-CM

## 2023-12-21 PROCEDURE — 76856 US EXAM PELVIC COMPLETE: CPT | Mod: TC

## 2023-12-21 PROCEDURE — 76856 US EXAM PELVIC COMPLETE: CPT | Mod: 26,,, | Performed by: RADIOLOGY

## 2023-12-21 PROCEDURE — 76856 US PELVIS COMPLETE NON OB: ICD-10-PCS | Mod: 26,,, | Performed by: RADIOLOGY

## 2023-12-22 LAB
CHLAMYDIA BY PCR: NEGATIVE
HPV 16: NEGATIVE
HPV 18: NEGATIVE
HPV OTHER: NEGATIVE
N. GONORRHOEAE (GC) BY PCR: NEGATIVE

## 2024-01-31 ENCOUNTER — PATIENT MESSAGE (OUTPATIENT)
Dept: OBSTETRICS AND GYNECOLOGY | Facility: CLINIC | Age: 34
End: 2024-01-31
Payer: MEDICAID

## 2024-01-31 ENCOUNTER — OFFICE VISIT (OUTPATIENT)
Dept: OBSTETRICS AND GYNECOLOGY | Facility: CLINIC | Age: 34
End: 2024-01-31
Payer: MEDICAID

## 2024-01-31 VITALS
SYSTOLIC BLOOD PRESSURE: 104 MMHG | BODY MASS INDEX: 30.43 KG/M2 | RESPIRATION RATE: 18 BRPM | WEIGHT: 155 LBS | HEART RATE: 83 BPM | OXYGEN SATURATION: 100 % | HEIGHT: 60 IN | DIASTOLIC BLOOD PRESSURE: 85 MMHG

## 2024-01-31 DIAGNOSIS — R82.71 BACTERIURIA: ICD-10-CM

## 2024-01-31 DIAGNOSIS — E28.319 PREMATURE MENOPAUSE: ICD-10-CM

## 2024-01-31 DIAGNOSIS — N39.0 URINARY TRACT INFECTION WITHOUT HEMATURIA, SITE UNSPECIFIED: ICD-10-CM

## 2024-01-31 DIAGNOSIS — N89.8 VAGINAL DISCHARGE: Primary | ICD-10-CM

## 2024-01-31 DIAGNOSIS — R79.89: ICD-10-CM

## 2024-01-31 DIAGNOSIS — N76.0 BACTERIAL VAGINOSIS: ICD-10-CM

## 2024-01-31 DIAGNOSIS — D36.9 PAPILLOMA: ICD-10-CM

## 2024-01-31 DIAGNOSIS — R93.89 ENDOMETRIAL THICKENING ON ULTRASOUND: ICD-10-CM

## 2024-01-31 DIAGNOSIS — B96.89 BACTERIAL VAGINOSIS: ICD-10-CM

## 2024-01-31 LAB
BACTERIA #/AREA URNS HPF: ABNORMAL /HPF
BILIRUB UR QL STRIP: NEGATIVE
CANDIDA SPECIES: NEGATIVE
CLARITY UR: ABNORMAL
COLOR UR: YELLOW
GARDNERELLA: POSITIVE
GLUCOSE UR STRIP-MCNC: NORMAL MG/DL
KETONES UR STRIP-SCNC: NEGATIVE MG/DL
LEUKOCYTE ESTERASE UR QL STRIP: ABNORMAL
MUCOUS, UA: ABNORMAL /LPF
NITRITE UR QL STRIP: NEGATIVE
PH UR STRIP: 6 PH UNITS
PROT UR QL STRIP: 30
RBC # UR STRIP: ABNORMAL /UL
RBC #/AREA URNS HPF: 9 /HPF
SP GR UR STRIP: 1.02
SQUAMOUS #/AREA URNS LPF: ABNORMAL /HPF
TRICHOMONAS: NEGATIVE
UROBILINOGEN UR STRIP-ACNC: NORMAL MG/DL
WBC #/AREA URNS HPF: >182 /HPF
WBC CLUMPS, UA: ABNORMAL /HPF

## 2024-01-31 PROCEDURE — 99215 OFFICE O/P EST HI 40 MIN: CPT | Mod: S$PBB,,, | Performed by: OBSTETRICS & GYNECOLOGY

## 2024-01-31 PROCEDURE — 87480 CANDIDA DNA DIR PROBE: CPT | Mod: ,,, | Performed by: CLINICAL MEDICAL LABORATORY

## 2024-01-31 PROCEDURE — 3074F SYST BP LT 130 MM HG: CPT | Mod: CPTII,,, | Performed by: OBSTETRICS & GYNECOLOGY

## 2024-01-31 PROCEDURE — 3079F DIAST BP 80-89 MM HG: CPT | Mod: CPTII,,, | Performed by: OBSTETRICS & GYNECOLOGY

## 2024-01-31 PROCEDURE — 87077 CULTURE AEROBIC IDENTIFY: CPT | Mod: ,,, | Performed by: CLINICAL MEDICAL LABORATORY

## 2024-01-31 PROCEDURE — 81001 URINALYSIS AUTO W/SCOPE: CPT | Mod: ,,, | Performed by: CLINICAL MEDICAL LABORATORY

## 2024-01-31 PROCEDURE — 87660 TRICHOMONAS VAGIN DIR PROBE: CPT | Mod: ,,, | Performed by: CLINICAL MEDICAL LABORATORY

## 2024-01-31 PROCEDURE — 3008F BODY MASS INDEX DOCD: CPT | Mod: CPTII,,, | Performed by: OBSTETRICS & GYNECOLOGY

## 2024-01-31 PROCEDURE — 99214 OFFICE O/P EST MOD 30 MIN: CPT | Mod: PBBFAC | Performed by: OBSTETRICS & GYNECOLOGY

## 2024-01-31 PROCEDURE — 87086 URINE CULTURE/COLONY COUNT: CPT | Mod: ,,, | Performed by: CLINICAL MEDICAL LABORATORY

## 2024-01-31 PROCEDURE — 87186 SC STD MICRODIL/AGAR DIL: CPT | Mod: ,,, | Performed by: CLINICAL MEDICAL LABORATORY

## 2024-01-31 PROCEDURE — 87510 GARDNER VAG DNA DIR PROBE: CPT | Mod: ,,, | Performed by: CLINICAL MEDICAL LABORATORY

## 2024-01-31 PROCEDURE — 1160F RVW MEDS BY RX/DR IN RCRD: CPT | Mod: CPTII,,, | Performed by: OBSTETRICS & GYNECOLOGY

## 2024-01-31 PROCEDURE — 1159F MED LIST DOCD IN RCRD: CPT | Mod: CPTII,,, | Performed by: OBSTETRICS & GYNECOLOGY

## 2024-01-31 RX ORDER — METRONIDAZOLE 500 MG/1
500 TABLET ORAL 3 TIMES DAILY
Qty: 84 TABLET | Refills: 0 | Status: SHIPPED | OUTPATIENT
Start: 2024-01-31 | End: 2024-02-14

## 2024-01-31 RX ORDER — NITROFURANTOIN 25; 75 MG/1; MG/1
100 CAPSULE ORAL 2 TIMES DAILY
Qty: 20 CAPSULE | Refills: 0 | Status: SHIPPED | OUTPATIENT
Start: 2024-01-31 | End: 2024-02-05

## 2024-01-31 NOTE — PATIENT INSTRUCTIONS
Dr. Arias Felton thanks you for this office visit at [facility].    Diagnosis for this visit:   Problem List Items Addressed This Visit    None  Visit Diagnoses       Vaginal discharge    -  Primary    Relevant Orders    Bacterial Vaginosis    Urinary tract infection without hematuria, site unspecified        Relevant Orders    Urinalysis, Reflex to Urine Culture    Abnormal gonadotropin level        Elevated FSH and LH in the very low estradiol level from her last labs on 12/18/2023    Premature menopause        Papilloma        Endometrial thickening on ultrasound        8.8 mm by December ultrasound of 2023             The Plan:  Repeat affirm study; recommend scheduling for resection of papilloma on her back beneath her bra strap since it is irritating her; urinalysis and urine culture; started on Macrobid b.i.d. for 10 days; neurology consultation -doubtful the patient has a pituitary adenoma causing secondary amenorrhea -most likely cause is premature menopause.               Recommendation:  Neurology consult; office endometrial evaluation-office hysteroscopy and biopsy; resect skin papilloma; after biopsy recommend initiation of low-dose estradiol plus Prometrium for relief of hot flashes.        Please practice best food and exercise habits for your age.    Dr. Arias Felton recommends avoidance of smoking and illicit medications or habits.    Please call  or schedule for any change in your health.     Please keep the next scheduled appointment or call for any need to change the appointment.     Dr. Arias Felton recommends yearly exams for good health maintenance.      Thank you    Dr. Arias Felton  01/31/2024 9:50 AM

## 2024-01-31 NOTE — PROGRESS NOTES
Vishal Clark [place] female  for   Chief Complaint   Patient presents with    follow up     Pt said she feel like she has a UTI    Follow-up on labs in treatment for bacterial vaginosis      OB History          2    Para   1    Term   1            AB        Living   3         SAB        IAB        Ectopic        Multiple   1    Live Births   1                  HPI:  Patient currently is a  3, para 3 at age 33. Patient presents for follow-up of her labs and also follow-up on treatment of bacterial vaginosis.  Patient states she has no vaginal discharge no vaginal odor.  She is complaining for the last week of some urgency frequency and dysuria.    Patient's labs were within normal limits except for evidence of bacterial vaginosis and she was treated with Flagyl.  Patient also was found have elevated gonadotropins and a very low estradiol level.  Her initial levels were low estradiol and elevated gonadotropins in May of 2023 and in December she had similar values.  Most recent estradiol level was 18 PICA g per mL.  Her prolactin level was normal.  There is no galactorrhea history.  Patient has had a normal thyroid profile in .  She has not had a menses since the withdrawal bleeding back after May visit 2023.        She is complaining of hot flashes.  Patient reports 3 hot flashes per day.  The frequency appears be stable.    She notes that in early 2023 she lost her older brother at age 48 from seizure disorder associated with a myocardial infarction.  She denies any seizure disorder.  She has no neurologic symptoms.    Her most recent ultrasound revealed a uterine length of 8.9 cm with endometrial thickness of 8.5 mm.  Adnexal evaluation was unremarkable.  The impression was no abnormality noted on pelvic ultrasound.    Ultrasound report:     CLINICAL HISTORY:  Secondary amenorrhea     TECHNIQUE:  Grayscale and color Doppler imaging performed Transabdominal imaging only      COMPARISON:  1 June 2023     FINDINGS:  The uterine length is 8.9 cm. Endometrial thickness is 8.5 mm. No abnormal echogenicity is seen.     The right ovary length is 3.1 cm.     The left ovary length is 2.6 cm.     No focal abnormality seen. No free fluid is identified     Impression:     No abnormality demonstrated.        Electronically signed by: Carl Schmid  Date:                                            12/21/2023  Time:                                           16:10           Exam Ended: 12/21/23 15:57 CST Last Resulted: 12/21/23 16:10 CST             Past Medical History:   Diagnosis Date    Gout       Past Surgical History:   Procedure Laterality Date    HERNIA REPAIR        Review of patient's allergies indicates:  No Known Allergies     Review of Systems:Pertinent items are noted in HPI.     Physical exam:    /85   Pulse 83   Resp 18   Ht 5' (1.524 m)   Wt 70.3 kg (155 lb)   SpO2 100%   BMI 30.27 kg/m²      General Appearance: healthy, alert, no distress, smiling    HEENT: Normal exam; thyroid exam    Lymphatic: no palpable lymphadenopathy, no hepatosplenomegaly    Chest:           Breasts:Normal breast tissue bilaterally           Lungs:clear to auscultation bilaterally           Heart: regular rate and rhythm, S1, S2 normal, no murmur, click, rub or gallop    Abdomen: soft, non-tender, without masses or organomegaly, without guarding, without rebound, and moderately overweight with a moderate abdominal panniculus    Pelvic:                    Vulva: normal appearing vulva with no masses, tenderness or lesions                    Cervix: normal appearing cervix without discharge or lesions, slightly friable appearance with a minimal white discharge but no odor; follow-up affirm sampling was performed                    Uterus: uterus is normal size, shape, consistency and nontender, anteverted, mobile                    Annexa(e): normal adnexa in size, nontender and no masses                    Rectal: rectal exam not indicated.     Extremity: normal    Skin: normal exam; papilloma on a stalk beneath the bra strap line on the left    Psych and neurologic exam: WNL                Assessment:   Problem List Items Addressed This Visit    None  Visit Diagnoses       Vaginal discharge    -  Primary    Relevant Orders    Bacterial Vaginosis (Completed)    Urinalysis (Completed)    Urine culture (Completed)    Urinalysis, Microscopic (Completed)    Urinary tract infection without hematuria, site unspecified        Relevant Orders    Bacterial Vaginosis (Completed)    Urinalysis (Completed)    Urine culture (Completed)    Urinalysis, Microscopic (Completed)    Abnormal gonadotropin level        Elevated FSH and LH in the very low estradiol level from her last labs on 12/18/2023    Premature menopause        Relevant Orders    Endometrial Biopsy- Today    Papilloma        Endometrial thickening on ultrasound        8.8 mm by December ultrasound of 2023    Bacterial vaginosis        Recurrent    Bacteriuria        46,000 colony count of Proteus mirabilis             Plan:  Repeat affirm study; recommend scheduling for resection of papilloma on her back beneath her bra strap since it is irritating her; urinalysis and urine culture; started on Macrobid b.i.d. for 10 days; neurology consultation -doubtful the patient has a pituitary adenoma causing secondary amenorrhea -most likely cause is premature menopause.               Recommendation:  Neurology consult; office endometrial evaluation-office hysteroscopy and biopsy; resect skin papilloma; after biopsy recommend initiation of low-dose estradiol plus Prometrium for relief of hot flashes.            Addendum on 01/31/2024 at 4:25 p.m.:  Patient has according affirm study today recurrent bacterial vaginosis that is probably sexually transmitted.  Dr. Felton will send to her a prescription for both her and her partner to used to hopefully eradicate the  bacterial vaginosis as repeat Flagyl therapy.         Addendum on 2/06/2024:  Patient was found have 46,000 colony count of Proteus mirabilis; since she was complaining of dysuria like symptoms Dr. Felton will send a prescription for the appropriate antibiotic.  She needs to have test for cure repeat analysis at her next checkup.  Dr. Felton will send a prescription for Bactrim.    0 Result Notes  Urine Culture 46,000 Proteus mirabilis Abnormal            Resulting Agency:     Susceptibility     Proteus mirabilis     Not Specified     Ampicillin <=8 µg/ml Sensitive     Ampicillin/Sulbactam <=8/4 µg/ml Sensitive     Aztreonam <=4 µg/ml Sensitive     Cefazolin <=2 µg/ml Sensitive     Cefepime <=2 µg/ml Sensitive     Ceftazidime <=1 µg/ml Sensitive     Ceftriaxone <=1 µg/ml Sensitive     Cefuroxime <=4 µg/ml Sensitive     Ciprofloxacin <=0.25 µg/ml Sensitive     Ertapenem <=0.5 µg/ml Sensitive     Gentamicin <=2 µg/ml Sensitive     Meropenem <=1 µg/ml Sensitive     Nitrofurantoin >64 µg/ml Resistant     Piperacillin/Tazobactam <=8 µg/ml Sensitive     Tetracycline >8 µg/ml Resistant     Tobramycin <=2 µg/ml Sensitive     Trimeth/Sulfa <=2/38 µg/ml Sensitive                  Specimen Collected: 01/31/24 10:11 CST Last Resulted: 02/02/24 08:09 CST        Lab Flowsheet        Order Details        View Encounter        Lab and Collection Details        Routing        Result History     View All Conversations on this Encounter           Result Care Coordination      Patient Communication     Add Comments   Seen Back to Top           Previously Reviewed Results (3)     Contains abnormal data Bacterial Vaginosis  Order: [id number]  Status: Final result       Visible to patient: Yes (seen)       Next appt: 02/12/2024 at 09:00 AM in Obstetrics and Gynecology (Arias Felton MD)       Dx: Vaginal discharge; Urinary tract infe...    Specimen Information: Vaginal; Genital   0 Result Notes          Component Ref Range & Units 5 d ago  1 mo ago 8 mo ago 1 yr ago   Candida Species Negative, Invalid Negative Negative Negative Negative   Gardnerella Negative, Invalid Positive Abnormal  Positive Abnormal  Positive Abnormal  Positive Abnormal    Trichomonas Negative, Invalid Negative Negative Negative Negative   Resulting Agency  [facility]              Specimen Collected: 01/31/24 10:11 CST Last Resulted: 01/31/24 15:49 CST        Lab Flowsheet        Order Details        View Encounter        Lab and Collection Details        Routing        Result History     View All Conversations on this Encounter           Result Care Coordination      Patient Communication     Add Comments   Seen Back to Top          Contains abnormal data Urinalysis, Microscopic  Order: [id number] - Reflex for Order [id number]  Status: Final result         Visible to patient: Yes (seen)         Next appt: 02/12/2024 at 09:00 AM in Obstetrics and Gynecology (Arias Felton MD)         Dx: Vaginal discharge; Urinary tract infe...      0 Result Notes             Component Ref Range & Units 5 d ago 1 mo ago 1 yr ago   WBC, UA <=5 /hpf >182 High  2    RBC, UA <=3 /hpf 9 High  3    Bacteria, UA None Seen /hpf Occasional Abnormal   Rare R   Squamous Epithelial Cells, UA None Seen /HPF Occasional Abnormal  Occasional Abnormal     WBC Clumps None Seen /hpf Few Abnormal      Mucous None Seen /LPF Occasional Abnormal  Occasional Abnormal     Resulting Agency  [facility]

## 2024-02-01 ENCOUNTER — TELEPHONE (OUTPATIENT)
Dept: OBSTETRICS AND GYNECOLOGY | Facility: CLINIC | Age: 34
End: 2024-02-01
Payer: MEDICAID

## 2024-02-01 ENCOUNTER — OFFICE VISIT (OUTPATIENT)
Dept: FAMILY MEDICINE | Facility: CLINIC | Age: 34
End: 2024-02-01
Payer: MEDICAID

## 2024-02-01 VITALS
DIASTOLIC BLOOD PRESSURE: 64 MMHG | WEIGHT: 154 LBS | TEMPERATURE: 97 F | BODY MASS INDEX: 30.23 KG/M2 | RESPIRATION RATE: 18 BRPM | SYSTOLIC BLOOD PRESSURE: 108 MMHG | HEIGHT: 60 IN | HEART RATE: 78 BPM | OXYGEN SATURATION: 96 %

## 2024-02-01 DIAGNOSIS — J02.9 ACUTE PHARYNGITIS, UNSPECIFIED ETIOLOGY: Primary | ICD-10-CM

## 2024-02-01 LAB
CTP QC/QA: YES
S PYO RRNA THROAT QL PROBE: NEGATIVE

## 2024-02-01 PROCEDURE — 1159F MED LIST DOCD IN RCRD: CPT | Mod: CPTII,,, | Performed by: NURSE PRACTITIONER

## 2024-02-01 PROCEDURE — 3008F BODY MASS INDEX DOCD: CPT | Mod: CPTII,,, | Performed by: NURSE PRACTITIONER

## 2024-02-01 PROCEDURE — 99212 OFFICE O/P EST SF 10 MIN: CPT | Mod: ,,, | Performed by: NURSE PRACTITIONER

## 2024-02-01 PROCEDURE — 87880 STREP A ASSAY W/OPTIC: CPT | Mod: RHCUB | Performed by: NURSE PRACTITIONER

## 2024-02-01 PROCEDURE — 3074F SYST BP LT 130 MM HG: CPT | Mod: CPTII,,, | Performed by: NURSE PRACTITIONER

## 2024-02-01 PROCEDURE — 3078F DIAST BP <80 MM HG: CPT | Mod: CPTII,,, | Performed by: NURSE PRACTITIONER

## 2024-02-01 NOTE — PROGRESS NOTES
Subjective:       Patient ID: Vishal Pennington is a 33 y.o. female.    Chief Complaint: Sore Throat (Daughter Has Strep - About 3 days Ago) and Cough (Dry Cough)    Sore Throat (Daughter Has Strep - About 3 days Ago) and Cough (Dry Cough)      Sore Throat   Associated symptoms include coughing. Pertinent negatives include no abdominal pain, congestion, ear pain, headaches, neck pain, shortness of breath or vomiting.   Cough  Associated symptoms include a sore throat. Pertinent negatives include no chest pain, ear pain, fever, headaches, rash or shortness of breath.     Review of Systems   Constitutional:  Negative for appetite change, fatigue and fever.   HENT:  Positive for sore throat. Negative for nasal congestion and ear pain.    Eyes:  Negative for pain, discharge and itching.   Respiratory:  Positive for cough. Negative for shortness of breath.    Cardiovascular:  Negative for chest pain and leg swelling.   Gastrointestinal:  Negative for abdominal pain, change in bowel habit, nausea and vomiting.   Musculoskeletal:  Negative for back pain, gait problem and neck pain.   Integumentary:  Negative for rash and wound.   Allergic/Immunologic: Negative for immunocompromised state.   Neurological:  Negative for dizziness, weakness and headaches.   All other systems reviewed and are negative.        Objective:      Physical Exam  Vitals and nursing note reviewed.   Constitutional:       General: She is not in acute distress.     Appearance: Normal appearance. She is not ill-appearing, toxic-appearing or diaphoretic.   HENT:      Head: Normocephalic.      Right Ear: Tympanic membrane, ear canal and external ear normal.      Left Ear: Tympanic membrane, ear canal and external ear normal.      Nose: Congestion present. No rhinorrhea.      Mouth/Throat:      Mouth: Mucous membranes are moist.      Pharynx: Posterior oropharyngeal erythema (injected) present. No oropharyngeal exudate.   Eyes:      General: No scleral  icterus.        Right eye: No discharge.         Left eye: No discharge.      Extraocular Movements: Extraocular movements intact.      Conjunctiva/sclera: Conjunctivae normal.      Pupils: Pupils are equal, round, and reactive to light.   Cardiovascular:      Rate and Rhythm: Normal rate and regular rhythm.      Pulses: Normal pulses.      Heart sounds: Normal heart sounds. No murmur heard.  Pulmonary:      Effort: Pulmonary effort is normal. No respiratory distress.      Breath sounds: Normal breath sounds. No wheezing, rhonchi or rales.   Musculoskeletal:         General: Normal range of motion.      Cervical back: Neck supple. No tenderness.   Lymphadenopathy:      Cervical: No cervical adenopathy.   Skin:     General: Skin is warm and dry.      Capillary Refill: Capillary refill takes less than 2 seconds.      Findings: No rash.   Neurological:      Mental Status: She is alert and oriented to person, place, and time.   Psychiatric:         Mood and Affect: Mood normal.         Behavior: Behavior normal.         Thought Content: Thought content normal.         Judgment: Judgment normal.          Assessment:       1. Acute pharyngitis, unspecified etiology        Plan:   Acute pharyngitis, unspecified etiology  -     POCT rapid strep A       Since pt is already taking 2 antibiotics for another issue she does not want an antibiotic for the sore throat    Risks, benefits, and side effects were discussed with the patient. All questions were answered to the fullest satisfaction of the patient, and pt verbalized understanding and agreement to treatment plan. Pt was to call with any new or worsening symptoms, or present to the ER

## 2024-02-01 NOTE — TELEPHONE ENCOUNTER
Left message for pt to return call back to clinic.         ----- Message from Melinda Medina sent at 2/1/2024  8:49 AM CST -----  Regarding: MEDICAITON  PATIENT CALL SHE IS HAVING SOME CONCERN ABOUT THE MEDICATION SHE RECEIVED FROM DR GRAF CALL BACK NUMBER [phone]

## 2024-02-02 LAB — UA COMPLETE W REFLEX CULTURE PNL UR: ABNORMAL

## 2024-02-05 ENCOUNTER — PATIENT MESSAGE (OUTPATIENT)
Dept: OBSTETRICS AND GYNECOLOGY | Facility: CLINIC | Age: 34
End: 2024-02-05
Payer: MEDICAID

## 2024-02-05 RX ORDER — SULFAMETHOXAZOLE AND TRIMETHOPRIM 800; 160 MG/1; MG/1
1 TABLET ORAL 2 TIMES DAILY
Qty: 28 TABLET | Refills: 0 | Status: SHIPPED | OUTPATIENT
Start: 2024-02-05 | End: 2024-02-19

## 2024-02-12 ENCOUNTER — PROCEDURE VISIT (OUTPATIENT)
Dept: OBSTETRICS AND GYNECOLOGY | Facility: CLINIC | Age: 34
End: 2024-02-12
Payer: MEDICAID

## 2024-02-12 VITALS
HEIGHT: 60 IN | DIASTOLIC BLOOD PRESSURE: 82 MMHG | OXYGEN SATURATION: 97 % | RESPIRATION RATE: 18 BRPM | WEIGHT: 152.19 LBS | BODY MASS INDEX: 29.88 KG/M2 | SYSTOLIC BLOOD PRESSURE: 102 MMHG | HEART RATE: 92 BPM | TEMPERATURE: 97 F

## 2024-02-12 DIAGNOSIS — E28.319 PREMATURE MENOPAUSE: Primary | ICD-10-CM

## 2024-02-12 PROCEDURE — 96372 THER/PROPH/DIAG INJ SC/IM: CPT | Mod: PBBFAC,59 | Performed by: OBSTETRICS & GYNECOLOGY

## 2024-02-12 PROCEDURE — 99459 PELVIC EXAMINATION: CPT | Mod: PBBFAC | Performed by: OBSTETRICS & GYNECOLOGY

## 2024-02-12 PROCEDURE — 58100 BIOPSY OF UTERUS LINING: CPT | Mod: PBBFAC | Performed by: OBSTETRICS & GYNECOLOGY

## 2024-02-12 PROCEDURE — 58100 BIOPSY OF UTERUS LINING: CPT | Mod: S$PBB,,, | Performed by: OBSTETRICS & GYNECOLOGY

## 2024-02-12 PROCEDURE — 88305 TISSUE EXAM BY PATHOLOGIST: CPT | Mod: 26,,, | Performed by: PATHOLOGY

## 2024-02-12 PROCEDURE — 99499 UNLISTED E&M SERVICE: CPT | Mod: S$PBB,25,, | Performed by: OBSTETRICS & GYNECOLOGY

## 2024-02-12 PROCEDURE — 99999PBSHW PR PBB SHADOW TECHNICAL ONLY FILED TO HB: Mod: PBBFAC,,,

## 2024-02-12 PROCEDURE — 88305 TISSUE EXAM BY PATHOLOGIST: CPT | Mod: TC,SUR | Performed by: OBSTETRICS & GYNECOLOGY

## 2024-02-12 RX ORDER — KETOROLAC TROMETHAMINE 30 MG/ML
60 INJECTION, SOLUTION INTRAMUSCULAR; INTRAVENOUS
Status: COMPLETED | OUTPATIENT
Start: 2024-02-12 | End: 2024-02-12

## 2024-02-12 RX ORDER — PROGESTERONE 100 MG/1
100 CAPSULE ORAL NIGHTLY
Qty: 30 CAPSULE | Refills: 11 | Status: SHIPPED | OUTPATIENT
Start: 2024-02-12 | End: 2025-02-11

## 2024-02-12 RX ORDER — ESTRADIOL 1 MG/1
1 TABLET ORAL DAILY
Qty: 30 TABLET | Refills: 11 | Status: SHIPPED | OUTPATIENT
Start: 2024-02-12 | End: 2025-02-11

## 2024-02-12 RX ADMIN — KETOROLAC TROMETHAMINE 60 MG: 60 INJECTION, SOLUTION INTRAMUSCULAR at 11:02

## 2024-02-12 NOTE — PATIENT INSTRUCTIONS
Dr. Arias Felton thanks you for this office procedure visit at [facility].    The diagnosis for this procedure:   Problem List Items Addressed This Visit    None  Visit Diagnoses       Premature menopause    -  Primary             The office procedure performed was:  Endometrial biopsy.    Dr. Arias Felton recommends the following:    For external or internal genital office procedure:     A) Refrain from sexual intercourse for 5 days days  B) Shower for several days  C) Resume a regular diet for meals and resume normal activity in the evening after the procedure.  D) Resume work activities the next day  E) For discomfort relief, Dr Arias Felton recommends over the counter:              Tylenol - one or two 500 mg strength every 6-8 hours for discomfort relief       Warning:Do not exceed more than 4 grams of Tylenol per day and avoid alcohol use while using the Tylenol dosing schedule.  OR        Ibuprofen 800 mg every 6-8 hours for discomfort relief    General advise:    1) Please call back for any adverse symptoms after the procedure.     2) Please use medication(s) as directed.    3) Please call or schedule for any change in your current health.     Plan:   Initiate hormone replacement therapy with oral estradiol 1 mg daily as well as Prometrium 100 mg daily.  Patient was given Toradol 60 mg IM just prior to the biopsy.  Patient was given postop endometrial biopsy instructions.  She tolerated procedure very well.            Today she declined resection of the papilloma beneath the bra strap on the left.             Patient to follow-up in 3 months on hormone replacement therapy.      Thank you    Arias Felton MD  02/12/2024 9:56 AM

## 2024-02-12 NOTE — PROGRESS NOTES
Vishal Clark [place] female  for   Chief Complaint   Patient presents with    Here for endometrial biopsy    Procedure     Patient is here for a Endo bx, patient stated that she was having some pain yesterday except for some cramping and sinus pressure       OB History          2    Para   1    Term   1            AB        Living   3         SAB        IAB        Ectopic        Multiple   1    Live Births   1                  HPI:  Patient is here for an endometrial biopsy.  The patient has been diagnosed premature menopause.  The patient will have a endometrial biopsy prior to instituting hormonal therapy replacement.  Subsequently patient be placed on hormone replacement therapy.  Patient has 2-3 hot flashes per day.  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    She recently treated for bacterial vaginosis.        Past Medical History:   Diagnosis Date    Gout       Past Surgical History:   Procedure Laterality Date    HERNIA REPAIR        Review of patient's allergies indicates:  No Known Allergies     Review of Systems:Pertinent items are noted in HPI.     Physical exam:  This gyn related exam was chaperoned by a female medical assistant.    /82 (BP Location: Left arm, Patient Position: Sitting)   Pulse 92   Temp 96.7 °F (35.9 °C)   Resp 18   Ht 5' (1.524 m)   Wt 69 kg (152 lb 3.2 oz)   LMP  (LMP Unknown)   SpO2 97%   BMI 29.72 kg/m²      General Appearance: healthy, alert, no distress; very short stature    HEENT: Normal exam    Lymphatic: no palpable lymphadenopathy, no hepatosplenomegaly    Chest:           Breasts:  Not performed           Lungs:clear to auscultation bilaterally           Heart: regular rate and rhythm, S1, S2 normal, no murmur, click, rub or gallop    Abdomen: soft, non-tender, without masses or organomegaly    Pelvic:                    Vulva: normal appearing vulva with no masses, tenderness or  lesions; speculum exam reveals no evidence of recurrent bacterial vaginosis after Flagyl therapy                    Cervix: normal appearing cervix without discharge or lesions                    Uterus: uterus is normal size, shape, consistency and nontender, anteverted, mobile                 Annexa(e):  Not palpated                   Rectal: rectal exam not indicated.     Extremity: normal    Skin: normal exam    Psych and neurologic exam: WNL                Assessment:   Problem List Items Addressed This Visit    None  Visit Diagnoses       Premature menopause    -  Primary             Plan:  Initiate hormone replacement therapy with oral estradiol 1 mg daily as well as Prometrium 100 mg daily.  Patient was given the choice of daily therapy versus daily therapy with cyclic Prometrium 21 out 28 days.  Since the patient has secondary amenorrhea, the patient has elected to take the medication daily.  Note oral contraception for replacement therapy as cause nausea in the past.  This is the reason no oral contraceptive therapy was used.  Patient was given Toradol 60 mg IM just prior to the biopsy.  Patient was given postop endometrial biopsy instructions.  She tolerated procedure very well.            Today she declined resection of the papilloma beneath the bra strap on the left.             Patient to follow-up in 3 months on hormone replacement therapy.

## 2024-02-12 NOTE — PROCEDURES
Patient Name: Vishal Pennington  MRN: [id number]  [facility] - Obstetrics And Gynecology    Date of Surgery:02/12/2024    Operative Report:    Before the procedure: Consent for Office Endometrial Biopsy Procedure    The office based procedure in very minimal. The procedure will ample the lining (endometrium) of the uterus. The vagina and cervix will be inspected and a Hurricane anesthetic spray will induce a minor local anesthesia on the cervix. A grasper (tenaculum) will be placed on the cervix to stabilize the uterus for the actual biopsy. A very small diameter disposable (use once) suction endometrial biopsy will be performed after insertion through the cervical canal and into the endometrial cavity of the uterus for a tissue diagnosis.The procedure is very brief. Passage into the uterine canal and endometrial cavity produces moderate but brief cramping similar to a menses (menstrual period).After the procedure there may be some cramping and slight bleeding or spotting. These post procedure symptoms resolve very quickly.Prior to the procedure, The staff will usually inject Toradol to help reduce the discomfort of the brief procedure.    The risks: Very minimal risk of post procedure infection of the uterine lining (endometritis) or vaginal infection. There is some cramping during and after the procedure.     Post Biopsy Instructions:    The patient is advised to shower for a few days after the procedure and avoid sexual intercourse. She is advised to call the office for any symptoms including fever, chills, urinary tract symptoms, significant vaginal bleeding or abdomnal /pelvic pain that is worsening.    Ivshal Eduardogina Pennington has voiced understanding and she does consent. Vishal Pennington is aware that this conversation is impossible to include all possible items of risk.    This conversion with Dr. Arias Felton and Vishal Pennington occurred on 02/12/2024 at 9:54 AM as part of the  completion of the pre-operative evaluation in preparation for surgery.    Procerdure: Office Endometrial Biopsy at [facility]      Pre op Diagnosis:  Premature menopause    Post op Diagnosis:  Premature menopause    Procedure: Surgeon: Dr Arias Felton    Anesthesia: Local Tell Hurricane Anesthesia    Findings:  Anteflexed uterus sounding to 9-10 cm from the exocervix    Complication(s): none    Condition: Excellent    Estimated Blood loss:  Negligible    Description:    Vishal Pennington was placed into the dorso lithotomy position position for the procedure.    The vulva and the vagina were prepped with Betadine solution. A disposable speculum was placed into the vagina. The cervix was visualized.    Hurricane spray was used to spray the vagina and the cervix.    A single tooth tenaculum grasped the exocervix and the cervix was placed under traction. The uterus was sound to 9 cm.    The endomerial cavity was anteroflexed.    The tip of the flexible endometrial biopsy tool was placed into the endocervical canal and passed into the endometrial cavity.     [place] endometrial biopsy under suction obtained a sample of the endometrium. The procedure involved turning the biopsy tool 360 degrees for uniform sampling within the endometrial cavity.    After the sample was obtained, there was noted slightbleeding from the exocervix.    The tenaculum was removed. The speculum was removed.    The patient was placed supine and then allowed to sit up. Subsequently she was ambulated.    She tolerated the procedure - her post biopsy condition: Exit    EBL was  negligible    The tissue obtained was sent in formalin to the pathology lab for histopathologic evaluation.    Arias Felton MD  [facility]  Signed:  02/12/2024 9:54 AM            .

## 2024-02-13 LAB
ESTROGEN SERPL-MCNC: NORMAL PG/ML
INSULIN SERPL-ACNC: NORMAL U[IU]/ML
LAB AP CLINICAL INFORMATION: NORMAL
LAB AP GROSS DESCRIPTION: NORMAL
LAB AP LABORATORY NOTES: NORMAL
T3RU NFR SERPL: NORMAL %

## 2024-05-13 ENCOUNTER — OFFICE VISIT (OUTPATIENT)
Dept: OBSTETRICS AND GYNECOLOGY | Facility: CLINIC | Age: 34
End: 2024-05-13
Payer: MEDICAID

## 2024-05-13 VITALS
RESPIRATION RATE: 18 BRPM | HEART RATE: 78 BPM | BODY MASS INDEX: 32.59 KG/M2 | WEIGHT: 166 LBS | OXYGEN SATURATION: 99 % | DIASTOLIC BLOOD PRESSURE: 78 MMHG | TEMPERATURE: 98 F | HEIGHT: 60 IN | SYSTOLIC BLOOD PRESSURE: 103 MMHG

## 2024-05-13 DIAGNOSIS — E28.319 PREMATURE MENOPAUSE: Primary | ICD-10-CM

## 2024-05-13 DIAGNOSIS — B96.89 BACTERIAL VAGINOSIS: ICD-10-CM

## 2024-05-13 DIAGNOSIS — N91.1 AMENORRHEA, SECONDARY: ICD-10-CM

## 2024-05-13 DIAGNOSIS — N39.0 PYURIA DUE TO BACTERIAL URINARY TRACT INFECTION: ICD-10-CM

## 2024-05-13 DIAGNOSIS — Z11.3 SCREENING FOR STDS (SEXUALLY TRANSMITTED DISEASES): ICD-10-CM

## 2024-05-13 DIAGNOSIS — N89.8 VAGINAL DISCHARGE: ICD-10-CM

## 2024-05-13 DIAGNOSIS — N76.0 BACTERIAL VAGINOSIS: ICD-10-CM

## 2024-05-13 LAB
BILIRUB UR QL STRIP: NEGATIVE
CANDIDA SPECIES: NEGATIVE
CLARITY UR: CLEAR
COLOR UR: COLORLESS
GARDNERELLA: POSITIVE
GLUCOSE UR STRIP-MCNC: NORMAL MG/DL
KETONES UR STRIP-SCNC: NEGATIVE MG/DL
LEUKOCYTE ESTERASE UR QL STRIP: ABNORMAL
MUCOUS, UA: ABNORMAL /LPF
NITRITE UR QL STRIP: NEGATIVE
PH UR STRIP: 5.5 PH UNITS
PROT UR QL STRIP: NEGATIVE
RBC # UR STRIP: NEGATIVE /UL
RBC #/AREA URNS HPF: <1 /HPF
SP GR UR STRIP: 1.01
TRICHOMONAS: NEGATIVE
UROBILINOGEN UR STRIP-ACNC: NORMAL MG/DL
WBC #/AREA URNS HPF: 7 /HPF

## 2024-05-13 PROCEDURE — 87510 GARDNER VAG DNA DIR PROBE: CPT | Mod: ,,, | Performed by: CLINICAL MEDICAL LABORATORY

## 2024-05-13 PROCEDURE — 3078F DIAST BP <80 MM HG: CPT | Mod: CPTII,,, | Performed by: OBSTETRICS & GYNECOLOGY

## 2024-05-13 PROCEDURE — 87491 CHLMYD TRACH DNA AMP PROBE: CPT | Mod: ,,, | Performed by: CLINICAL MEDICAL LABORATORY

## 2024-05-13 PROCEDURE — 3008F BODY MASS INDEX DOCD: CPT | Mod: CPTII,,, | Performed by: OBSTETRICS & GYNECOLOGY

## 2024-05-13 PROCEDURE — 99459 PELVIC EXAMINATION: CPT | Mod: S$PBB,,, | Performed by: OBSTETRICS & GYNECOLOGY

## 2024-05-13 PROCEDURE — 87480 CANDIDA DNA DIR PROBE: CPT | Mod: ,,, | Performed by: CLINICAL MEDICAL LABORATORY

## 2024-05-13 PROCEDURE — 99214 OFFICE O/P EST MOD 30 MIN: CPT | Mod: PBBFAC | Performed by: OBSTETRICS & GYNECOLOGY

## 2024-05-13 PROCEDURE — 3074F SYST BP LT 130 MM HG: CPT | Mod: CPTII,,, | Performed by: OBSTETRICS & GYNECOLOGY

## 2024-05-13 PROCEDURE — 87660 TRICHOMONAS VAGIN DIR PROBE: CPT | Mod: ,,, | Performed by: CLINICAL MEDICAL LABORATORY

## 2024-05-13 PROCEDURE — 1159F MED LIST DOCD IN RCRD: CPT | Mod: CPTII,,, | Performed by: OBSTETRICS & GYNECOLOGY

## 2024-05-13 PROCEDURE — 81001 URINALYSIS AUTO W/SCOPE: CPT | Mod: ,,, | Performed by: CLINICAL MEDICAL LABORATORY

## 2024-05-13 PROCEDURE — 99459 PELVIC EXAMINATION: CPT | Mod: PBBFAC | Performed by: OBSTETRICS & GYNECOLOGY

## 2024-05-13 PROCEDURE — 99215 OFFICE O/P EST HI 40 MIN: CPT | Mod: S$PBB,,, | Performed by: OBSTETRICS & GYNECOLOGY

## 2024-05-13 PROCEDURE — 87591 N.GONORRHOEAE DNA AMP PROB: CPT | Mod: ,,, | Performed by: CLINICAL MEDICAL LABORATORY

## 2024-05-13 NOTE — PATIENT INSTRUCTIONS
Dr. Arias Felton thanks you for this office visit at [facility].    Diagnosis for this visit:   Problem List Items Addressed This Visit    None  Visit Diagnoses       Premature menopause    -  Primary    Pyuria due to bacterial urinary tract infection        Screening for STDs (sexually transmitted diseases)        Amenorrhea, secondary                 The Plan:               Limited STD check; affirm study; urinalysis and urine screen for screening bacteria             Recheck gonadotropins and estradiol              Patient was advised use the Estradiol for 21 days -3 weeks -then stop on week 4                          Use Prometrium 100 mg on week 2 for 1 week and then not resume until the 2nd week of the next cycle      Please practice best food and exercise habits for your age.    Dr. Arias Felton recommends avoidance of smoking and illicit medications or habits.    Please call  or schedule for any change in your health.     Please keep the next scheduled appointment or call for any need to change the appointment.     Dr. Arias Felton recommends yearly exams for good health maintenance.      Thank you    Dr. Arias Felton  05/13/2024 12:00 PM

## 2024-05-13 NOTE — PROGRESS NOTES
Vishal Clark [place] female  for   Chief Complaint   Patient presents with    Follow-up     Patient stated she is doing okay from the last time she was seen, Endometrial BX done, added patient still has not gotten her period. Patient is aslo wanting to get rechecked for STI           PHI:  Patient was 33 years of age  1, para 1 AB1 Afro-American female presents for a check.  The patient was given estradiol as well as Prometrium to use on a scheduled basis.  She was confused on its use and stopped 1 month ago.  Her endometrial biopsy was performed before onset of HRT therapy; the endometrial biopsy is benign.  Her gonadotropins ordered in 2023 revealed elevated LH, elevated FSH and a very low estradiol of 18 PICA g per mL.  This pattern was indicative of premature menopause.    Estrogen /Prometrium therapy did relieve hot flashes.  Patient has now return of hot flashes since she stopped the HRT medication this month.  She can not use oral contraception medication since contraceptive pills produce nausea.    Patient was noted in the past to have a pyuria.  She was treated for the pyuria but she currently hasno urgency symptoms and no dysuria.    Patient wants an STD check.  She denies any significant vaginal discharge.        Past Medical History:   Diagnosis Date    Gout       Past Surgical History:   Procedure Laterality Date    HERNIA REPAIR        Review of patient's allergies indicates:  No Known Allergies     ROS:Pertinent items are noted in HPI.    Physical exam:This gyn related exam was chaperoned by a female medical assistant.      /78 (BP Location: Left arm, Patient Position: Sitting)   Pulse 78   Temp 97.8 °F (36.6 °C)   Resp 18   Ht 5' (1.524 m)   Wt 75.3 kg (166 lb)   LMP  (LMP Unknown)   SpO2 99%   BMI 32.42 kg/m²      General Appearance: healthy    Chest:           Lungs: clear to auscultation bilaterally           Heart: regular rate and rhythm, S1, S2 normal, no  murmur, click, rub or gallop    Abdomen abdominal exam is unremarkable with good bowel sounds in the abdomen is non-tender.    Pelvic:  Normal vulva; no abnormality is noted; nontender cervix nontender uterus and no adnexal masses  No rectal exam    Extremity: normal; no CVA tenderness bilaterally    Skin: normal exam        Assessment:   Problem List Items Addressed This Visit    None  Visit Diagnoses       Premature menopause    -  Primary    Relevant Orders    Bacterial Vaginosis (Completed)    Chlamydia/GC, PCR (Completed)    Follicle Stimulating Hormone    Estradiol    Luteinizing Hormone    HIV 1/2 Ag/Ab (4th Gen)    Treponema Pallidum (Syphillis) Antibody    Pyuria due to bacterial urinary tract infection        Relevant Orders    Bacterial Vaginosis (Completed)    Chlamydia/GC, PCR (Completed)    Follicle Stimulating Hormone    Estradiol    Luteinizing Hormone    HIV 1/2 Ag/Ab (4th Gen)    Treponema Pallidum (Syphillis) Antibody    Urinalysis, Reflex to Urine Culture (Completed)    Urinalysis, Microscopic (Completed)    Screening for STDs (sexually transmitted diseases)        Relevant Orders    Bacterial Vaginosis (Completed)    Chlamydia/GC, PCR (Completed)    Follicle Stimulating Hormone    Estradiol    Luteinizing Hormone    HIV 1/2 Ag/Ab (4th Gen)    Treponema Pallidum (Syphillis) Antibody    Amenorrhea, secondary        Relevant Orders    Bacterial Vaginosis (Completed)    Chlamydia/GC, PCR (Completed)    Follicle Stimulating Hormone    Estradiol    Luteinizing Hormone    HIV 1/2 Ag/Ab (4th Gen)    Treponema Pallidum (Syphillis) Antibody    Bacterial vaginosis        Confirmed by affirm study today    Vaginal discharge                 Plan:  Limited STD check; affirm study; urinalysis and urine screen for screening bacteria             Recheck gonadotropins and estradiol             Patient was advised to use the Estradiol for 21 days (3  weeks -then stop until the next month and restart the 1st day                           Use Prometrium 100 mg on day 14 for 10 days and then stop.  Resume on the next day 14 for 10 days              Hopefully she will have a withdrawal bleeding on this regimen.              Addendum on 05/14/2024:  The vaginal discharge affirm study indicates bacterial vaginosis that is recurrent.  She will be re-treated with Flagyl and doctor Jose Francisco will recommend that her sexual partner be treated as well as recommend with sexual intercourse barrier condom methods to prevent reinfection.  A note be sent to the patient.

## 2024-05-14 ENCOUNTER — PATIENT MESSAGE (OUTPATIENT)
Dept: OBSTETRICS AND GYNECOLOGY | Facility: CLINIC | Age: 34
End: 2024-05-14
Payer: MEDICAID

## 2024-05-14 LAB
CHLAMYDIA BY PCR: NEGATIVE
N. GONORRHOEAE (GC) BY PCR: NEGATIVE

## 2024-05-14 RX ORDER — METRONIDAZOLE 500 MG/1
500 TABLET ORAL 3 TIMES DAILY
Qty: 84 TABLET | Refills: 0 | Status: SHIPPED | OUTPATIENT
Start: 2024-05-14 | End: 2024-05-28

## 2024-05-23 ENCOUNTER — TELEPHONE (OUTPATIENT)
Dept: OBSTETRICS AND GYNECOLOGY | Facility: CLINIC | Age: 34
End: 2024-05-23
Payer: MEDICAID

## 2024-05-23 NOTE — TELEPHONE ENCOUNTER
Called pt via phone and verified identity by . returned call to pt regarding medication. Pt aware dr almonte is out and i will send him a mesage and that monday is a holiday and if she needs to seek treatment sooner to [facility].  Pt states she has stopped taking fdue to mood swings .pt aware no futher questions or concerns.

## 2024-05-28 ENCOUNTER — TELEPHONE (OUTPATIENT)
Dept: OBSTETRICS AND GYNECOLOGY | Facility: CLINIC | Age: 34
End: 2024-05-28
Payer: MEDICAID

## 2024-05-28 NOTE — TELEPHONE ENCOUNTER
Pt aware to keep scheduled appt and to get labs drawn asap. No further question or concerns. Pt state she is taking something and she hadn't had trouble with bv ever since. Plan of care ongoing.

## 2024-05-28 NOTE — TELEPHONE ENCOUNTER
----- Message from Arias Felton MD sent at 5/28/2024  7:51 AM CDT -----  Regarding: Last phone call to you concerning her mood swings and medications  Patient has on review of her records recurrent bacterial vaginosis that was obtained at her last clinic visit in 05/13/2024.    She has failed to follow directions in the past regards to take in both estrogen and progestin.  Mood swings may be addition to the premature menopause as well as medications.    She has failed at that visit to have her gonadotropins rechecked.    Probably needs another appointment but she has demonstrated poor compliance.    She probably needs with a appointment to discuss all her issues but I am not going work her in in the busy clinic.  She may need to keep her next appointment were fine another physician.

## 2024-06-27 ENCOUNTER — HOSPITAL ENCOUNTER (EMERGENCY)
Facility: HOSPITAL | Age: 34
Discharge: HOME | End: 2024-06-27
Payer: MEDICAID

## 2024-06-27 VITALS
BODY MASS INDEX: 31.41 KG/M2 | TEMPERATURE: 99 F | WEIGHT: 160 LBS | SYSTOLIC BLOOD PRESSURE: 113 MMHG | HEART RATE: 79 BPM | DIASTOLIC BLOOD PRESSURE: 86 MMHG | RESPIRATION RATE: 16 BRPM | HEIGHT: 60 IN | OXYGEN SATURATION: 98 %

## 2024-06-27 DIAGNOSIS — J32.9 SINUSITIS, UNSPECIFIED CHRONICITY, UNSPECIFIED LOCATION: Primary | ICD-10-CM

## 2024-06-27 DIAGNOSIS — H92.01 ACUTE OTALGIA, RIGHT: ICD-10-CM

## 2024-06-27 PROCEDURE — 10060 I&D ABSCESS SIMPLE/SINGLE: CPT | Mod: ,,, | Performed by: NURSE PRACTITIONER

## 2024-06-27 PROCEDURE — 10060 I&D ABSCESS SIMPLE/SINGLE: CPT

## 2024-06-27 PROCEDURE — 99283 EMERGENCY DEPT VISIT LOW MDM: CPT | Mod: 25

## 2024-06-27 PROCEDURE — 99284 EMERGENCY DEPT VISIT MOD MDM: CPT | Mod: 25,,, | Performed by: NURSE PRACTITIONER

## 2024-06-27 RX ORDER — CEPHALEXIN 500 MG/1
500 CAPSULE ORAL 4 TIMES DAILY
Qty: 28 CAPSULE | Refills: 0 | Status: SHIPPED | OUTPATIENT
Start: 2024-06-27 | End: 2024-07-04

## 2024-06-27 NOTE — ED PROVIDER NOTES
Encounter Date: 6/27/2024       History     Chief Complaint   Patient presents with    Hand Pain     Presented with c/o pain and swelling to right thumb at nail x 3 days.Denies injury to thumb. Also states has had right ear pain off and on for a month. Denies fever or chills, cough, sore throat. Notes that she has been having sinus congestion off and on as well.      Review of patient's allergies indicates:  No Known Allergies  Past Medical History:   Diagnosis Date    Gout      Past Surgical History:   Procedure Laterality Date    HERNIA REPAIR       Family History   Problem Relation Name Age of Onset    Hypertension Mother      Diabetes Mother      Breast cancer Sister      Breast cancer Paternal Aunt       Social History     Tobacco Use    Smoking status: Never    Smokeless tobacco: Never   Substance Use Topics    Alcohol use: Yes    Drug use: Never     Review of Systems   Constitutional: Negative.    HENT:  Positive for congestion (sinus), ear pain and postnasal drip.    Respiratory:  Negative for cough and wheezing.    Cardiovascular:  Negative for chest pain.   Gastrointestinal:  Negative for abdominal pain, nausea and vomiting.   Genitourinary: Negative.    Skin:  Positive for wound (right thumb).   Neurological:  Negative for dizziness and headaches.   Psychiatric/Behavioral: Negative.         Physical Exam     Initial Vitals [06/27/24 1608]   BP Pulse Resp Temp SpO2   113/86 79 16 98.8 °F (37.1 °C) 98 %      MAP       --         Physical Exam    Nursing note and vitals reviewed.  Constitutional: She appears well-developed and well-nourished. No distress.   HENT:   Head: Normocephalic.   Right Ear: External ear normal. Tympanic membrane is not erythematous. A middle ear effusion is present.   Left Ear: External ear normal. Tympanic membrane is not erythematous. A middle ear effusion is present.   Nose: Mucosal edema and rhinorrhea present.   Mouth/Throat: Oropharynx is clear and moist.   Eyes: Conjunctivae  and EOM are normal. Pupils are equal, round, and reactive to light.   Neck: Neck supple.   Normal range of motion.  Cardiovascular:  Normal rate, regular rhythm, normal heart sounds and intact distal pulses.           No murmur heard.  Pulmonary/Chest: Breath sounds normal.   Abdominal: Abdomen is soft.   Musculoskeletal:         General: Normal range of motion.        Hands:       Cervical back: Normal range of motion and neck supple.     Lymphadenopathy:     She has no cervical adenopathy.   Neurological: She is alert and oriented to person, place, and time. She has normal strength. GCS score is 15. GCS eye subscore is 4. GCS verbal subscore is 5. GCS motor subscore is 6.   Skin: Skin is warm and dry. Capillary refill takes less than 2 seconds.   Paronychia right thumb   Psychiatric: She has a normal mood and affect.         Medical Screening Exam   See Full Note    ED Course   I & D - Incision and Drainage    Date/Time: 6/27/2024 8:08 PM  Location procedure was performed: [facility] EMERGENCY DEPARTMENT    Performed by: Ivonne Kramer NP  Authorized by: Ivonne Kramer NP  Consent Done: Yes  Consent: Verbal consent obtained. Written consent obtained.  Risks and benefits: risks, benefits and alternatives were discussed  Consent given by: patient  Patient identity confirmed: verbally with patient  Type: abscess  Body area: upper extremity  Location details: right thumb  Anesthesia: see MAR for details (None)  Scalpel size: 18 g needle.  Incision type: single straight  Complexity: simple  Drainage: pus  Drainage amount: moderate  Wound treatment: incision and drainage  Complications: No  Specimens: No        Labs Reviewed - No data to display       Imaging Results    None          Medications - No data to display  Medical Decision Making  Presented with c/o pain and swelling to right thumb at nail x 3 days.Denies injury to thumb. Also states has had right ear pain off and on for a month. Denies fever or chills,  cough, sore throat. Notes that she has been having sinus congestion off and on as well.    Risk  Prescription drug management.  Risk Details: I&D to right thumb. Rx for cephalexin. Pt is stable. Afebrile. /86, HR 79, o2 sat 98% on RA.  Instructed on home care, med use, follow-up and return precautions. Discharged home with detailed written instructions provided.                                        Clinical Impression:   Final diagnoses:  [J32.9] Sinusitis, unspecified chronicity, unspecified location (Primary)  [H92.01] Acute otalgia, right        ED Disposition Condition    Discharge Stable          ED Prescriptions       Medication Sig Dispense Start Date End Date Auth. Provider    cephALEXin (KEFLEX) 500 MG capsule Take 1 capsule (500 mg total) by mouth 4 (four) times daily. for 7 days 28 capsule 6/27/2024 7/4/2024 Ivonne Kramer NP          Follow-up Information    None          Ivonne Kramer NP  06/27/24 2010

## 2024-06-27 NOTE — ED TRIAGE NOTES
Reports pain and swelling to right thumb with no known injury for 3 days, also right ear pain off and on for about a month that came back 1 day PTA

## 2024-06-27 NOTE — DISCHARGE INSTRUCTIONS
Take cephalexin as prescribed for the infection of your thumb. Take Zyrtec 10 mg daily for 14 days and use Flonase nasal spray 2 sprays to each nostril for 1 week. Follow-up with your PCP if symptoms persist. Return to the ED if the infection to your thumb worsens.

## 2024-08-12 ENCOUNTER — OFFICE VISIT (OUTPATIENT)
Dept: OBSTETRICS AND GYNECOLOGY | Facility: CLINIC | Age: 34
End: 2024-08-12
Payer: MEDICAID

## 2024-08-12 ENCOUNTER — TELEPHONE (OUTPATIENT)
Dept: OBSTETRICS AND GYNECOLOGY | Facility: CLINIC | Age: 34
End: 2024-08-12
Payer: MEDICAID

## 2024-08-12 VITALS
DIASTOLIC BLOOD PRESSURE: 68 MMHG | HEART RATE: 74 BPM | HEIGHT: 60 IN | WEIGHT: 160.19 LBS | TEMPERATURE: 99 F | SYSTOLIC BLOOD PRESSURE: 116 MMHG | OXYGEN SATURATION: 99 % | RESPIRATION RATE: 18 BRPM | BODY MASS INDEX: 31.45 KG/M2

## 2024-08-12 DIAGNOSIS — E28.319 PREMATURE MENOPAUSE: ICD-10-CM

## 2024-08-12 DIAGNOSIS — B96.89 BACTERIAL VAGINOSIS: ICD-10-CM

## 2024-08-12 DIAGNOSIS — N91.5 OLIGOMENORRHEA, UNSPECIFIED TYPE: Primary | ICD-10-CM

## 2024-08-12 DIAGNOSIS — N76.0 BACTERIAL VAGINOSIS: ICD-10-CM

## 2024-08-12 LAB
CANDIDA SPECIES: NEGATIVE
GARDNERELLA: NEGATIVE
TRICHOMONAS: NEGATIVE

## 2024-08-12 PROCEDURE — 99999 PR PBB SHADOW E&M-EST. PATIENT-LVL IV: CPT | Mod: PBBFAC,,, | Performed by: OBSTETRICS & GYNECOLOGY

## 2024-08-12 PROCEDURE — 99214 OFFICE O/P EST MOD 30 MIN: CPT | Mod: PBBFAC | Performed by: OBSTETRICS & GYNECOLOGY

## 2024-08-12 PROCEDURE — 1160F RVW MEDS BY RX/DR IN RCRD: CPT | Mod: CPTII,,, | Performed by: OBSTETRICS & GYNECOLOGY

## 2024-08-12 PROCEDURE — 99459 PELVIC EXAMINATION: CPT | Mod: PBBFAC | Performed by: OBSTETRICS & GYNECOLOGY

## 2024-08-12 PROCEDURE — 3008F BODY MASS INDEX DOCD: CPT | Mod: CPTII,,, | Performed by: OBSTETRICS & GYNECOLOGY

## 2024-08-12 PROCEDURE — 3074F SYST BP LT 130 MM HG: CPT | Mod: CPTII,,, | Performed by: OBSTETRICS & GYNECOLOGY

## 2024-08-12 PROCEDURE — 87510 GARDNER VAG DNA DIR PROBE: CPT | Mod: ,,, | Performed by: CLINICAL MEDICAL LABORATORY

## 2024-08-12 PROCEDURE — 99459 PELVIC EXAMINATION: CPT | Mod: S$PBB,,, | Performed by: OBSTETRICS & GYNECOLOGY

## 2024-08-12 PROCEDURE — 87660 TRICHOMONAS VAGIN DIR PROBE: CPT | Mod: ,,, | Performed by: CLINICAL MEDICAL LABORATORY

## 2024-08-12 PROCEDURE — 99214 OFFICE O/P EST MOD 30 MIN: CPT | Mod: S$PBB,,, | Performed by: OBSTETRICS & GYNECOLOGY

## 2024-08-12 PROCEDURE — 3078F DIAST BP <80 MM HG: CPT | Mod: CPTII,,, | Performed by: OBSTETRICS & GYNECOLOGY

## 2024-08-12 PROCEDURE — 1159F MED LIST DOCD IN RCRD: CPT | Mod: CPTII,,, | Performed by: OBSTETRICS & GYNECOLOGY

## 2024-08-12 PROCEDURE — 87480 CANDIDA DNA DIR PROBE: CPT | Mod: ,,, | Performed by: CLINICAL MEDICAL LABORATORY

## 2024-08-12 NOTE — PATIENT INSTRUCTIONS
Dr. Arias Felton thanks you for this office visit at [facility].    Diagnosis for this visit:   Problem List Items Addressed This Visit    None  Visit Diagnoses       Oligomenorrhea, unspecified type    -  Primary    Bacterial vaginosis        Currently clinically asymptomatic             The Plan: Recheck gonadotropins and estradiol; affirm study; monitor menstrual pattern      Please practice best food and exercise habits for your age.    Dr. Arias Felton recommends avoidance of smoking and illicit medications or habits.    Please call  or schedule for any change in your health.     Please keep the next scheduled appointment or call for any need to change the appointment.     Dr. Arias Felton recommends yearly exams for good health maintenance.      Thank you    Dr. Arias Felton  08/12/2024 11:21 AM   
Term [facility] Vaginal Delivery (>/= 37 weeks)

## 2024-08-12 NOTE — TELEPHONE ENCOUNTER
----- Message from Elza Ortega sent at 8/12/2024  3:30 PM CDT -----  Who Called: Vishal Pennington    Caller is requesting information on test results.    Pt is wanting to speak w nurse about a test on labs     Preferred Method of Contact: Phone Call  Patient's Preferred Phone Number on File: [phone]   Best Call Back Number, if different:  Additional Information:     Called patient back , no answer. Unable to LVM, mailbox was full .

## 2024-08-12 NOTE — PROGRESS NOTES
Vishal [place] female  for   Chief Complaint   Patient presents with    Follow-up     Patient is back ,medication to help with menstrual cycles did not work. However she did try a medication a friend recommened that helped start her cycle for 7 days however upon  trying it once again it did not work a second time.       OB History          2    Para   1    Term   1            AB        Living   3         SAB        IAB        Ectopic        Multiple   1    Live Births   1                  HPI:  Patient presents for follow-up.  She is 33 years of age  2, para 1 with twins presents for menstrual follow-up.  She has some hot flashes.  The patient states her last period was 2024.  The cycle lasted for 7 days.  She considered normal.    Patien has had significant amenorrhea; she has had hot flashes.  She has a presumptive diagnosis of premature menopause with a elevated gonadotropins.    The patient did use over-the-counter  Robb POP as the vaginal inserts to detoxify according to over-the-counter therapy and she did have a episode of vaginal bleeding after its use.  This was therapy by the patient on her own.  It was recommended by a friend.    She was here for follow-up of bacterial vaginosis.  She was asymptomatic after Flagyl therapy.    Patient was not sexually active.  Past Medical History:   Diagnosis Date    Gout       Past Surgical History:   Procedure Laterality Date    HERNIA REPAIR        Review of patient's allergies indicates:  No Known Allergies     Review of Systems:Pertinent items are noted in HPI.     Physical exam:This gyn related exam was chaperoned by a female medical assistant.      /68 (BP Location: Right arm, Patient Position: Sitting, BP Method: Small (Automatic))   Pulse 74   Temp 98.8 °F (37.1 °C)   Resp 18   Ht 5' (1.524 m)   Wt 72.7 kg (160 lb 3.2 oz)   LMP 2024   SpO2 99%   BMI 31.29 kg/m²      General Appearance: healthy, alert, no  distress, smiling    HEENT: Normal exam    Lymphatic: no palpable lymphadenopathy, no hepatosplenomegaly    Chest:           Breasts:Normal to inspection           Lungs:clear to auscultation bilaterally           Heart: regular rate and rhythm, S1, S2 normal, no murmur, click, rub or gallop    Abdomen: soft, non-tender, without masses or organomegaly, normal bowel sounds, without rebound, and no masses palpated    Pelvic:                    Vulva: normal appearing vulva with no masses, tenderness or lesions                    Cervix: normal appearing cervix without discharge or lesions; speculum exam of the vagina reveals no discharge no odor.                     Uterus: uterus is normal size, shape, consistency and nontender, anteverted, mobile - clinically no evidence of a softened uterus or pregnant                   Annexa(e): normal adnexa in size, nontender and no masses                   Rectal:  Not performed  Extremity: normal    Skin: normal exam    Psych and neurologic exam: WNL                Assessment:   Problem List Items Addressed This Visit    None  Visit Diagnoses       Oligomenorrhea, unspecified type    -  Primary    Poor response to oral estrogen and Prometrium therapy -inadequate withdrawal bleeding    Relevant Orders    Bacterial Vaginosis    Estradiol    Follicle Stimulating Hormone (Completed)    Luteinizing Hormone (Completed)    Progesterone    Prolactin (Completed)    Premature menopause        Bacterial vaginosis        Currently clinically asymptomatic    Relevant Orders    Bacterial Vaginosis    Estradiol    Follicle Stimulating Hormone (Completed)    Luteinizing Hormone (Completed)    Progesterone    Prolactin (Completed)             Plan:  Recheck gonadotropins and estradiol; affirm study; monitor menstrual pattern - patient was informed that she has premature menopause.  She is not cooperative and using the estradiol/Prometrium therapy.  She was not a candidate for some form of  cycle control and relief of hot flashes with oral contraception due to complaint of nausea with this method of therapy.

## 2024-12-22 ENCOUNTER — HOSPITAL ENCOUNTER (EMERGENCY)
Facility: HOSPITAL | Age: 34
Discharge: HOME | End: 2024-12-22
Payer: MEDICAID

## 2024-12-22 VITALS
TEMPERATURE: 98 F | SYSTOLIC BLOOD PRESSURE: 107 MMHG | HEART RATE: 85 BPM | WEIGHT: 165 LBS | DIASTOLIC BLOOD PRESSURE: 83 MMHG | OXYGEN SATURATION: 99 % | RESPIRATION RATE: 16 BRPM | BODY MASS INDEX: 32.39 KG/M2 | HEIGHT: 60 IN

## 2024-12-22 DIAGNOSIS — M54.6 CHRONIC THORACIC SPINE PAIN: ICD-10-CM

## 2024-12-22 DIAGNOSIS — M62.838 MUSCLE SPASM: ICD-10-CM

## 2024-12-22 DIAGNOSIS — N39.0 ACUTE URINARY TRACT INFECTION: ICD-10-CM
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
## 2024-12-22 DIAGNOSIS — J40 BRONCHITIS: ICD-10-CM

## 2024-12-22 DIAGNOSIS — G89.29 CHRONIC THORACIC SPINE PAIN: ICD-10-CM

## 2024-12-22 LAB
BACTERIA #/AREA URNS HPF: ABNORMAL /HPF
BILIRUB UR QL STRIP: NEGATIVE
CLARITY UR: CLEAR
COLOR UR: YELLOW
GLUCOSE UR STRIP-MCNC: NEGATIVE MG/DL
HCG UR QL IA.RAPID: NEGATIVE
KETONES UR STRIP-SCNC: NEGATIVE MG/DL
LEUKOCYTE ESTERASE UR QL STRIP: ABNORMAL
MUCOUS THREADS #/AREA URNS HPF: ABNORMAL /HPF
NITRITE UR QL STRIP: NEGATIVE
PH UR STRIP: 6 PH UNITS
PROT UR QL STRIP: NEGATIVE
RBC # UR STRIP: NEGATIVE /UL
RBC #/AREA URNS HPF: ABNORMAL /HPF
SP GR UR STRIP: 1.01
SQUAMOUS #/AREA URNS LPF: ABNORMAL /LPF
TRANS CELLS #/AREA URNS LPF: ABNORMAL /LPF
UROBILINOGEN UR STRIP-ACNC: 0.2 MG/DL
WBC #/AREA URNS HPF: ABNORMAL /HPF

## 2024-12-22 PROCEDURE — 99284 EMERGENCY DEPT VISIT MOD MDM: CPT | Mod: ,,, | Performed by: NURSE PRACTITIONER

## 2024-12-22 PROCEDURE — 81025 URINE PREGNANCY TEST: CPT | Performed by: NURSE PRACTITIONER

## 2024-12-22 PROCEDURE — 87086 URINE CULTURE/COLONY COUNT: CPT | Performed by: NURSE PRACTITIONER

## 2024-12-22 PROCEDURE — 81003 URINALYSIS AUTO W/O SCOPE: CPT | Performed by: NURSE PRACTITIONER

## 2024-12-22 PROCEDURE — 63600175 PHARM REV CODE 636 W HCPCS: Performed by: NURSE PRACTITIONER

## 2024-12-22 PROCEDURE — 99284 EMERGENCY DEPT VISIT MOD MDM: CPT | Mod: 25

## 2024-12-22 PROCEDURE — 96372 THER/PROPH/DIAG INJ SC/IM: CPT | Performed by: NURSE PRACTITIONER

## 2024-12-22 RX ORDER — ORPHENADRINE CITRATE 30 MG/ML
60 INJECTION INTRAMUSCULAR; INTRAVENOUS
Status: COMPLETED | OUTPATIENT
Start: 2024-12-22 | End: 2024-12-22

## 2024-12-22 RX ORDER — ALBUTEROL SULFATE 90 UG/1
1-2 INHALANT RESPIRATORY (INHALATION) EVERY 6 HOURS PRN
Qty: 8 G | Refills: 0 | Status: SHIPPED | OUTPATIENT
Start: 2024-12-22 | End: 2025-01-05

## 2024-12-22 RX ORDER — KETOROLAC TROMETHAMINE 30 MG/ML
30 INJECTION, SOLUTION INTRAMUSCULAR; INTRAVENOUS
Status: COMPLETED | OUTPATIENT
Start: 2024-12-22 | End: 2024-12-22

## 2024-12-22 RX ORDER — CEFDINIR 300 MG/1
300 CAPSULE ORAL 2 TIMES DAILY
Qty: 14 CAPSULE | Refills: 0 | Status: SHIPPED | OUTPATIENT
Start: 2024-12-22 | End: 2024-12-29

## 2024-12-22 RX ORDER — CYCLOBENZAPRINE HCL 10 MG
10 TABLET ORAL 3 TIMES DAILY PRN
Qty: 15 TABLET | Refills: 0 | Status: SHIPPED | OUTPATIENT
Start: 2024-12-22 | End: 2024-12-27

## 2024-12-22 RX ADMIN — ORPHENADRINE CITRATE 60 MG: 30 INJECTION, SOLUTION INTRAMUSCULAR; INTRAVENOUS at 06:12

## 2024-12-22 RX ADMIN — KETOROLAC TROMETHAMINE 30 MG: 30 INJECTION, SOLUTION INTRAMUSCULAR at 06:12
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
## 2024-12-23 NOTE — DISCHARGE INSTRUCTIONS
Take cyclobenzeprine 10 mg every 8 hours as needed along with tylenol or ibuprofen as needed for back pain. Cyclobenzeprine will cause drowsiness so do not drive while taking it. Apply warm compresses or cool compresses 4-6 times per day as needed. May use Icy Hot or Biofreeze topical analgesic to area of pain as needed for pain. Take cefdinir as prescribed for UTI and bronchitis for all 7 days. Use albuterol inhaler 2 puffs every 4 hours for 2-3 days then every 6 hours for 2-3 days. Stop smoking and vaping. Follow-up with your PCP in 1 week if symptoms persist. Return to the ED for new or worsening symptoms.

## 2024-12-25 LAB — UA COMPLETE W REFLEX CULTURE PNL UR: NORMAL

## 2025-03-20 ENCOUNTER — OFFICE VISIT (OUTPATIENT)
Dept: OBSTETRICS AND GYNECOLOGY | Facility: CLINIC | Age: 35
End: 2025-03-20
Payer: MEDICAID

## 2025-03-20 VITALS
OXYGEN SATURATION: 100 % | RESPIRATION RATE: 19 BRPM | WEIGHT: 153 LBS | DIASTOLIC BLOOD PRESSURE: 70 MMHG | BODY MASS INDEX: 30.04 KG/M2 | HEIGHT: 60 IN | SYSTOLIC BLOOD PRESSURE: 110 MMHG | HEART RATE: 89 BPM

## 2025-03-20 DIAGNOSIS — Z80.3 FAMILY HISTORY OF BREAST CANCER: ICD-10-CM

## 2025-03-20 DIAGNOSIS — E28.319 PREMATURE MENOPAUSE: ICD-10-CM

## 2025-03-20 DIAGNOSIS — N91.5 OLIGOMENORRHEA, UNSPECIFIED TYPE: Primary | ICD-10-CM

## 2025-03-20 DIAGNOSIS — Z12.31 ENCOUNTER FOR SCREENING MAMMOGRAM FOR MALIGNANT NEOPLASM OF BREAST: ICD-10-CM

## 2025-03-20 DIAGNOSIS — N62 BREAST HYPERTROPHY IN FEMALE: ICD-10-CM

## 2025-03-20 PROCEDURE — 1159F MED LIST DOCD IN RCRD: CPT | Mod: CPTII,,, | Performed by: ADVANCED PRACTICE MIDWIFE

## 2025-03-20 PROCEDURE — 99214 OFFICE O/P EST MOD 30 MIN: CPT | Mod: S$PBB,,, | Performed by: ADVANCED PRACTICE MIDWIFE

## 2025-03-20 PROCEDURE — 3074F SYST BP LT 130 MM HG: CPT | Mod: CPTII,,, | Performed by: ADVANCED PRACTICE MIDWIFE

## 2025-03-20 PROCEDURE — 3008F BODY MASS INDEX DOCD: CPT | Mod: CPTII,,, | Performed by: ADVANCED PRACTICE MIDWIFE

## 2025-03-20 PROCEDURE — 3078F DIAST BP <80 MM HG: CPT | Mod: CPTII,,, | Performed by: ADVANCED PRACTICE MIDWIFE

## 2025-03-20 PROCEDURE — 99214 OFFICE O/P EST MOD 30 MIN: CPT | Mod: PBBFAC | Performed by: ADVANCED PRACTICE MIDWIFE

## 2025-03-20 PROCEDURE — 99999 PR PBB SHADOW E&M-EST. PATIENT-LVL IV: CPT | Mod: PBBFAC,,, | Performed by: ADVANCED PRACTICE MIDWIFE

## 2025-03-20 NOTE — PROGRESS NOTES
Subjective     Patient ID: Vishal Pennington is a 34 y.o. female.    Chief Complaint: Gynecologic Exam (No periods in greater than 1 year. Pts last pap was in 2023 WNL, by Dr. Felton. Would like to to be retested for BV./)    Last pap  WNL  Cycles have been irregular for approx 5 years.  Had stopped having periods and was evaluated and given estrogen/progesterone.  This did make her have a light cycle approx 1 year or more ago.  Has not had any bleeding/spotting since.   C/O intermittent hot flashes.   C/O back and shoulder pain due to large breasts.   Wears wider strap bras and has indentions in shoulders.   Would like to consider breast reduction.          Gynecologic Exam  Associated symptoms include back pain. Pertinent negatives include no frequency or urgency.     Review of Systems   Constitutional: Negative.    HENT: Negative.     Eyes: Negative.    Respiratory: Negative.     Cardiovascular: Negative.    Gastrointestinal: Negative.    Endocrine: Negative.    Genitourinary: Negative.  Positive for menstrual irregularity. Negative for difficulty urinating, frequency and urgency.   Musculoskeletal:  Positive for back pain and neck pain.   Integumentary:  Negative.   Allergic/Immunologic: Negative.    Neurological: Negative.    Psychiatric/Behavioral: Negative.       Past Medical History:   Diagnosis Date    Back pain     Gout      Past Surgical History:   Procedure Laterality Date    HERNIA REPAIR        OB History    Para Term  AB Living   2 1 1   3   SAB IAB Ectopic Multiple Live Births      1 1      # Outcome Date GA Lbr Nasim/2nd Weight Sex Type Anes PTL Lv   2             1A Term      Vag-Spont   JOSE MANUEL   1B Term               Current Outpatient Medications   Medication Instructions    albuterol (PROVENTIL/VENTOLIN HFA) 90 mcg/actuation inhaler 1-2 puffs, Inhalation, Every 6 hours PRN, Rescue         Objective   Vitals:    25 1040   BP: 110/70   Pulse: 89   Resp: 19     Wt  Readings from Last 2 Encounters:   03/20/25 74.8 kg (165 lb)   12/22/24 74.8 kg (165 lb)      Physical Exam  Vitals reviewed. Exam conducted with a chaperone present.   Constitutional:       Appearance: Normal appearance.   HENT:      Head: Normocephalic.   Cardiovascular:      Rate and Rhythm: Normal rate and regular rhythm.   Pulmonary:      Effort: Pulmonary effort is normal.      Breath sounds: Normal breath sounds.   Chest:   Breasts:     Right: Normal. No mass.      Left: Normal. No mass.      Comments: Large pendulous breasts  Abdominal:      General: Abdomen is flat.      Palpations: Abdomen is soft.   Genitourinary:     General: Normal vulva.      Exam position: Lithotomy position.      Vagina: Normal. No vaginal discharge, erythema, tenderness, bleeding or lesions.      Cervix: No cervical motion tenderness, discharge, friability or lesion.      Uterus: Normal. Not enlarged and not tender.       Adnexa: Right adnexa normal and left adnexa normal.        Right: No mass, tenderness or fullness.          Left: No mass, tenderness or fullness.     Musculoskeletal:         General: Normal range of motion.      Cervical back: Normal range of motion.   Skin:     General: Skin is warm and dry.   Neurological:      Mental Status: She is alert and oriented to person, place, and time.   Psychiatric:         Mood and Affect: Mood normal.         Behavior: Behavior normal.        Assessment and Plan   1. Oligomenorrhea, unspecified type  -     Luteinizing Hormone; Future; Expected date: 03/20/2025  -     Follicle Stimulating Hormone; Future; Expected date: 03/20/2025  -     Progesterone; Future; Expected date: 03/20/2025  -     TSH; Future; Expected date: 03/20/2025  -     T4, Free; Future; Expected date: 03/20/2025  -     Estradiol; Future; Expected date: 03/20/2025    2. Premature menopause  -     Luteinizing Hormone; Future; Expected date: 03/20/2025  -     Follicle Stimulating Hormone; Future; Expected date:  03/20/2025  -     Progesterone; Future; Expected date: 03/20/2025  -     TSH; Future; Expected date: 03/20/2025  -     T4, Free; Future; Expected date: 03/20/2025  -     Estradiol; Future; Expected date: 03/20/2025    3. Encounter for screening mammogram for malignant neoplasm of breast  -     Mammo Digital Screening Bilat w/ Brian (XPD); Future; Expected date: 04/03/2025    4. Family history of breast cancer  -     Mammo Digital Screening Bilat w/ Brian (XPD); Future; Expected date: 04/03/2025    5. Breast hypertrophy in female    Empower drawn today  MMG ordered today   Labs ordered     Follow up in about 1 year (around 3/20/2026), or if symptoms worsen or fail to improve, for Annual Exam.

## 2025-08-04 ENCOUNTER — HOSPITAL ENCOUNTER (EMERGENCY)
Facility: HOSPITAL | Age: 35
Discharge: HOME | End: 2025-08-04
Payer: MEDICAID

## 2025-08-04 VITALS
DIASTOLIC BLOOD PRESSURE: 71 MMHG | SYSTOLIC BLOOD PRESSURE: 107 MMHG | HEIGHT: 60 IN | HEART RATE: 71 BPM | BODY MASS INDEX: 29.64 KG/M2 | WEIGHT: 151 LBS | OXYGEN SATURATION: 98 % | TEMPERATURE: 98 F | RESPIRATION RATE: 16 BRPM

## 2025-08-04 DIAGNOSIS — D22.9 CHANGE IN SKIN MOLE: Primary | ICD-10-CM

## 2025-08-04 PROCEDURE — 99281 EMR DPT VST MAYX REQ PHY/QHP: CPT

## 2025-08-04 NOTE — DISCHARGE INSTRUCTIONS
Keep mole covered to prevent pulling/tearing.    Referral to dermatology due to significant changes in mole color, size, and pain.  Follow up with your primary care provider in 2 days. Return to the emergency department for any increase in symptoms or for any other new or worrisome symptoms.

## 2025-08-04 NOTE — ED PROVIDER NOTES
Encounter Date: 8/4/2025       History     Chief Complaint   Patient presents with    Skin Problem     Pt presents to ed with c/o inflamed mole on her back with NKI      34-year-old female presents to the emergency department to be evaluated for a mole on the left side of her back that has become more painful over the past few days.  She states that the mole started out as being dark in color and closer to the skin, but she has noticed that it is now appearing like a skin tag.  It has lost color and will occasionally bleed.  She believes it may be causing some back spasms when she tries to lie flat.    The history is provided by the patient.     Review of patient's allergies indicates:  No Known Allergies  Past Medical History:   Diagnosis Date    Back pain     Gout      Past Surgical History:   Procedure Laterality Date    HERNIA REPAIR       Family History   Problem Relation Name Age of Onset    Hypertension Mother      Diabetes Mother      Breast cancer Sister  40 - 49    Breast cancer Paternal Aunt  60 - 69    Colon cancer Neg Hx      Ovarian cancer Neg Hx       Social History[1]  Review of Systems   Constitutional: Negative.    HENT: Negative.     Respiratory: Negative.     Cardiovascular: Negative.    Gastrointestinal: Negative.    Genitourinary: Negative.    Musculoskeletal:  Positive for myalgias (Mid back).   Skin:  Positive for color change (Mole on the left side of her back has lost pigment, and is also bleeding intermittently.).   Neurological: Negative.    Hematological: Negative.    Psychiatric/Behavioral: Negative.         Physical Exam     Initial Vitals [08/04/25 1637]   BP Pulse Resp Temp SpO2   107/71 71 16 98 °F (36.7 °C) 98 %      MAP       --         Physical Exam    Vitals reviewed.  Constitutional: She appears well-developed and well-nourished. She is not diaphoretic. No distress.   HENT:   Head: Normocephalic and atraumatic.   Right Ear: External ear normal.   Left Ear: External ear normal.    Nose: Nose normal. Mouth/Throat: Oropharynx is clear and moist. No oropharyngeal exudate.   Eyes: EOM are normal. Pupils are equal, round, and reactive to light.   Neck: Neck supple. No tracheal deviation present. No JVD present.   Normal range of motion.  Cardiovascular:  Normal rate, regular rhythm and intact distal pulses.           Pulmonary/Chest: Breath sounds normal. No stridor. No respiratory distress.   Abdominal: Abdomen is soft. Bowel sounds are normal. She exhibits no distension. There is no abdominal tenderness.   Musculoskeletal:         General: No tenderness or edema. Normal range of motion.      Cervical back: Normal range of motion and neck supple.     Neurological: She is alert and oriented to person, place, and time. She has normal strength. No cranial nerve deficit or sensory deficit. GCS score is 15. GCS eye subscore is 4. GCS verbal subscore is 5. GCS motor subscore is 6.   Skin: Skin is warm and dry. Capillary refill takes less than 2 seconds.        Psychiatric: She has a normal mood and affect. Thought content normal.         Medical Screening Exam   See Full Note    ED Course   Procedures  Labs Reviewed - No data to display       Imaging Results    None          Medications - No data to display  Medical Decision Making  34-year-old female presents to the emergency department to be evaluated for a mole on the left side of her back that has become more painful over the past few days.  She states that the mole started out as being dark in color and closer to the skin, but she has noticed that it is now appearing like a skin tag.  It has lost color and will occasionally bleed.  She believes it may be causing some back spasms when she tries to lie flat.  She is concerned of possible skin cancer.      Diagnosis: Change in skin mole  Referral to Dermatology for further evaluation and treatment, pathology if indicated.                                      Clinical Impression:   Final  diagnoses:  [D22.9] Change in skin mole (Primary)        ED Disposition Condition    Discharge Stable          ED Prescriptions    None       Follow-up Information       Follow up With Specialties Details Why Contact Info    Aranza Nelson MD Dermatology   [address] [place] [id number]  [phone]                 [1]   Social History  Tobacco Use    Smoking status: Never    Smokeless tobacco: Never   Substance Use Topics    Alcohol use: Yes    Drug use: Not Currently     Types: Marijuana        Jer Ospina FNP  08/04/25 0107